# Patient Record
Sex: MALE | Race: WHITE | NOT HISPANIC OR LATINO | Employment: FULL TIME | URBAN - METROPOLITAN AREA
[De-identification: names, ages, dates, MRNs, and addresses within clinical notes are randomized per-mention and may not be internally consistent; named-entity substitution may affect disease eponyms.]

---

## 2017-02-02 ENCOUNTER — TELEPHONE (OUTPATIENT)
Dept: MEDICAL GROUP | Facility: MEDICAL CENTER | Age: 47
End: 2017-02-02

## 2017-02-02 DIAGNOSIS — Z00.00 PREVENTATIVE HEALTH CARE: Chronic | ICD-10-CM

## 2017-02-03 ENCOUNTER — HOSPITAL ENCOUNTER (OUTPATIENT)
Dept: RADIOLOGY | Facility: MEDICAL CENTER | Age: 47
End: 2017-02-03

## 2017-02-03 NOTE — TELEPHONE ENCOUNTER
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    Since he is almost due for the remainder of his labs, if he does not mind, we will check everything including his cholesterol, blood sugar, liver and kidney function, I will place all those fasting orders in the computer system

## 2017-02-08 ENCOUNTER — HOSPITAL ENCOUNTER (OUTPATIENT)
Dept: LAB | Facility: MEDICAL CENTER | Age: 47
End: 2017-02-08
Attending: INTERNAL MEDICINE
Payer: COMMERCIAL

## 2017-02-08 ENCOUNTER — TELEPHONE (OUTPATIENT)
Dept: MEDICAL GROUP | Facility: MEDICAL CENTER | Age: 47
End: 2017-02-08

## 2017-02-08 DIAGNOSIS — Z00.00 PREVENTATIVE HEALTH CARE: Chronic | ICD-10-CM

## 2017-02-08 LAB
25(OH)D3 SERPL-MCNC: 58 NG/ML (ref 30–100)
ALBUMIN SERPL BCP-MCNC: 4.7 G/DL (ref 3.2–4.9)
ALBUMIN/GLOB SERPL: 1.4 G/DL
ALP SERPL-CCNC: 104 U/L (ref 30–99)
ALT SERPL-CCNC: 20 U/L (ref 2–50)
ANION GAP SERPL CALC-SCNC: 7 MMOL/L (ref 0–11.9)
AST SERPL-CCNC: 24 U/L (ref 12–45)
BASOPHILS # BLD AUTO: 0.03 K/UL (ref 0–0.12)
BASOPHILS NFR BLD AUTO: 0.6 % (ref 0–1.8)
BILIRUB SERPL-MCNC: 1.2 MG/DL (ref 0.1–1.5)
BUN SERPL-MCNC: 15 MG/DL (ref 8–22)
CALCIUM SERPL-MCNC: 9.6 MG/DL (ref 8.5–10.5)
CHLORIDE SERPL-SCNC: 101 MMOL/L (ref 96–112)
CHOLEST SERPL-MCNC: 244 MG/DL (ref 100–199)
CO2 SERPL-SCNC: 27 MMOL/L (ref 20–33)
CREAT SERPL-MCNC: 1.06 MG/DL (ref 0.5–1.4)
EOSINOPHIL # BLD: 0.09 K/UL (ref 0–0.51)
EOSINOPHIL NFR BLD AUTO: 1.9 % (ref 0–6.9)
ERYTHROCYTE [DISTWIDTH] IN BLOOD BY AUTOMATED COUNT: 44 FL (ref 35.9–50)
GLOBULIN SER CALC-MCNC: 3.3 G/DL (ref 1.9–3.5)
GLUCOSE SERPL-MCNC: 92 MG/DL (ref 65–99)
HCT VFR BLD AUTO: 49.9 % (ref 42–52)
HDLC SERPL-MCNC: 65 MG/DL
HGB BLD-MCNC: 16.9 G/DL (ref 14–18)
IMM GRANULOCYTES # BLD AUTO: 0.01 K/UL (ref 0–0.11)
IMM GRANULOCYTES NFR BLD AUTO: 0.2 % (ref 0–0.9)
LDLC SERPL CALC-MCNC: 166 MG/DL
LYMPHOCYTES # BLD: 1.18 K/UL (ref 1–4.8)
LYMPHOCYTES NFR BLD AUTO: 24.9 % (ref 22–41)
MCH RBC QN AUTO: 31.6 PG (ref 27–33)
MCHC RBC AUTO-ENTMCNC: 33.9 G/DL (ref 33.7–35.3)
MCV RBC AUTO: 93.4 FL (ref 81.4–97.8)
MONOCYTES # BLD: 0.29 K/UL (ref 0–0.85)
MONOCYTES NFR BLD AUTO: 6.1 % (ref 0–13.4)
NEUTROPHILS # BLD: 3.14 K/UL (ref 1.82–7.42)
NEUTROPHILS NFR BLD AUTO: 66.3 % (ref 44–72)
NRBC # BLD AUTO: 0 K/UL
NRBC BLD-RTO: 0 /100 WBC
PLATELET # BLD AUTO: 291 K/UL (ref 164–446)
PMV BLD AUTO: 10.1 FL (ref 9–12.9)
POTASSIUM SERPL-SCNC: 4.6 MMOL/L (ref 3.6–5.5)
PROT SERPL-MCNC: 8 G/DL (ref 6–8.2)
RBC # BLD AUTO: 5.34 M/UL (ref 4.7–6.1)
SODIUM SERPL-SCNC: 135 MMOL/L (ref 135–145)
TRIGL SERPL-MCNC: 66 MG/DL (ref 0–149)
TSH SERPL DL<=0.005 MIU/L-ACNC: 0.91 UIU/ML (ref 0.3–3.7)
URATE SERPL-MCNC: 6.2 MG/DL (ref 2.5–8.3)
WBC # BLD AUTO: 4.7 K/UL (ref 4.8–10.8)

## 2017-02-08 PROCEDURE — 80053 COMPREHEN METABOLIC PANEL: CPT

## 2017-02-08 PROCEDURE — 82306 VITAMIN D 25 HYDROXY: CPT

## 2017-02-08 PROCEDURE — 85025 COMPLETE CBC W/AUTO DIFF WBC: CPT

## 2017-02-08 PROCEDURE — 84443 ASSAY THYROID STIM HORMONE: CPT

## 2017-02-08 PROCEDURE — 84550 ASSAY OF BLOOD/URIC ACID: CPT

## 2017-02-08 PROCEDURE — 80061 LIPID PANEL: CPT

## 2017-02-08 PROCEDURE — 36415 COLL VENOUS BLD VENIPUNCTURE: CPT

## 2017-02-09 ENCOUNTER — TELEPHONE (OUTPATIENT)
Dept: MEDICAL GROUP | Facility: MEDICAL CENTER | Age: 47
End: 2017-02-09

## 2017-02-10 NOTE — TELEPHONE ENCOUNTER
I notified the patient with results.    He had a recent MRI of his left knee at [facility], could you please call them and have the results faxed to us?

## 2017-02-10 NOTE — TELEPHONE ENCOUNTER
Notified with labs, LDL increased but had recent knee injury and fracture so has not been exercising, will obtain MRI copy of results from [facility]. No medication necessary for dyslipidemia, HDL is elevated, no history of hypertension, diabetes, tobacco.      Otherwise continue vitamin D supplementation    Uric acid 6.2, previous MRI showed potential gouty changes right first toe but has not had any gout symptoms, we can defer follow-up MRI imaging of that toe unless he experiences symptoms

## 2017-02-11 PROBLEM — S83.209A MENISCUS TEAR: Status: ACTIVE | Noted: 2017-02-11

## 2017-04-22 ENCOUNTER — HOSPITAL ENCOUNTER (OUTPATIENT)
Facility: MEDICAL CENTER | Age: 47
End: 2017-04-22
Attending: EMERGENCY MEDICINE
Payer: COMMERCIAL

## 2017-04-22 ENCOUNTER — OFFICE VISIT (OUTPATIENT)
Dept: URGENT CARE | Facility: CLINIC | Age: 47
End: 2017-04-22
Payer: COMMERCIAL

## 2017-04-22 VITALS
HEART RATE: 65 BPM | OXYGEN SATURATION: 97 % | DIASTOLIC BLOOD PRESSURE: 90 MMHG | RESPIRATION RATE: 16 BRPM | TEMPERATURE: 97.9 F | SYSTOLIC BLOOD PRESSURE: 132 MMHG

## 2017-04-22 DIAGNOSIS — J02.9 PHARYNGITIS, UNSPECIFIED ETIOLOGY: ICD-10-CM

## 2017-04-22 LAB
INT CON NEG: NEGATIVE
INT CON POS: POSITIVE
S PYO AG THROAT QL: NEGATIVE

## 2017-04-22 PROCEDURE — 87880 STREP A ASSAY W/OPTIC: CPT | Performed by: EMERGENCY MEDICINE

## 2017-04-22 PROCEDURE — 87070 CULTURE OTHR SPECIMN AEROBIC: CPT

## 2017-04-22 PROCEDURE — 99203 OFFICE O/P NEW LOW 30 MIN: CPT | Mod: 25 | Performed by: EMERGENCY MEDICINE

## 2017-04-22 RX ORDER — ERGOCALCIFEROL 1.25 MG/1
CAPSULE ORAL
COMMUNITY
End: 2023-01-19

## 2017-04-22 ASSESSMENT — ENCOUNTER SYMPTOMS
MYALGIAS: 1
SWOLLEN GLANDS: 1
COUGH: 0
CHILLS: 1
TROUBLE SWALLOWING: 0

## 2017-04-22 NOTE — MR AVS SNAPSHOT
Alexandre Hernández   2017 4:30 PM   Office Visit   MRN: [id number]    Department:  [facility]   Dept Phone:  [phone]    Description:  Male : 1970   Provider:  Johnson Arceo M.D.           Reason for Visit     Pharyngitis x 2 days,sore throat, fatigue, chills      Allergies as of 2017     Allergen Noted Reactions    Contrast Media With Iodine [Iodine] 2012   Anaphylaxis    Hydrocodone 2016   Rash    Rash      Oxycodone 2016   Rash    rash      Vital Signs     Blood Pressure Pulse Temperature Respirations Oxygen Saturation Smoking Status    132/90 mmHg 65 36.6 °C (97.9 °F) 16 97% Never Smoker       Basic Information     Date Of Birth Sex Race Ethnicity Preferred Language    1970 Male White Non- English      Problem List              ICD-10-CM Priority Class Noted - Resolved    S/P aortic valve replacement (Chronic) Z95.2   4/3/2012 - Present    Disorder of bursae and tendons in shoulder region M71.9, M67.919   2014 - Present    S/P rotator cuff surgery Z98.890   2016 - Present    Foot abscess, right L02.611   2016 - Present    S/P Achilles tendon repair Z98.890   2016 - Present    History of thumb surgery Z98.890   2016 - Present    Osteoma D16.9   2016 - Present    Skin cancer, basal cell C44.91   2016 - Present    Adverse reaction to contrast media T50.8X5A   2016 - Present    Dyslipidemia (Chronic) E78.5   2016 - Present    Neutropenia (CMS-HCC) D70.9   2016 - Present    Preventative health care (Chronic) Z00.00   2016 - Present    Meniscus tear S83.209A   2017 - Present      Health Maintenance        Date Due Completion Dates    IMM DTaP/Tdap/Td Vaccine (1 - Tdap) 1989 ---            Current Immunizations     Influenza Vaccine Quad Inj (Pf) 10/20/2016    Influenza Vaccine Quad Inj (Preserved) 11/3/2015, 10/15/2014    Tuberculin Skin Test 2013 12:30 PM, 2013 12:55 PM,  11/15/2012  1:30 PM      Below and/or attached are the medications your provider expects you to take. Review all of your home medications and newly ordered medications with your provider and/or pharmacist. Follow medication instructions as directed by your provider and/or pharmacist. Please keep your medication list with you and share with your provider. Update the information when medications are discontinued, doses are changed, or new medications (including over-the-counter products) are added; and carry medication information at all times in the event of emergency situations     Allergies:  CONTRAST MEDIA WITH IODINE - Anaphylaxis     HYDROCODONE - Rash     OXYCODONE - Rash               Medications  Valid as of: April 22, 2017 -  5:26 PM    Generic Name Brand Name Tablet Size Instructions for use    Aspirin   Take  by mouth.        Ergocalciferol (Cap) DRISDOL 36914 UNITS Take  by mouth every 7 days.        Fexofenadine HCl   Take  by mouth.        Fluticasone Propionate (Suspension) FLONASE 50 MCG/ACT Spray 1 Spray in nose every day.        .                 Medicines prescribed today were sent to:     [id number] - [place], [place] - [address]    [address] [facility] [address]    Phone: [phone] Fax: [phone]    Open 24 Hours?: No    "[id number] - [place], [place] - [address] AT [facility] [address]    [address] [facility] [address]    Phone: [phone] Fax: [phone]    Open 24 Hours?: No      Medication refill instructions:       If your prescription bottle indicates you have medication refills left, it is not necessary to call your provider’s office. Please contact your pharmacy and they will refill your medication.    If your prescription bottle indicates you do not have any refills left, you may request refills at any time through one of the following ways: The online [employer] system (except Urgent Care), by calling your provider’s office, or by asking your  pharmacy to contact your provider’s office with a refill request. Medication refills are processed only during regular business hours and may not be available until the next business day. Your provider may request additional information or to have a follow-up visit with you prior to refilling your medication.   *Please Note: Medication refills are assigned a new Rx number when refilled electronically. Your pharmacy may indicate that no refills were authorized even though a new prescription for the same medication is available at the pharmacy. Please request the medicine by name with the pharmacy before contacting your provider for a refill.        Other Notes About Your Plan     5/12/16 next visit pneumovax  3/16 repeat MRI right first toe one year, apparently the MRI right first toe findings in [place] did show evidence of chronic gout although the patient has been asymptomatic and has no history of gout with normal uric acid  2/8/17 uric 6.2 no further symptoms, defer follow-up MRI unless symptoms occur           MyChart Access Code: Activation code not generated  Current J[employer] Status: Active

## 2017-04-23 DIAGNOSIS — J02.9 PHARYNGITIS, UNSPECIFIED ETIOLOGY: ICD-10-CM

## 2017-04-23 NOTE — PROGRESS NOTES
Subjective:      Alexandre Hernández is a 47 y.o. male who presents with Pharyngitis            Pharyngitis   This is a new problem. Episode onset: 2 days. The problem has been unchanged. Neither side of throat is experiencing more pain than the other. Maximum temperature: subjective. The fever has been present for 1 to 2 days. The pain is mild. Associated symptoms include swollen glands. Pertinent negatives include no coughing, ear pain or trouble swallowing.    no recent significant exacerbation of allergic rhinitis  Review of Systems   Constitutional: Positive for chills and malaise/fatigue.   HENT: Negative for ear pain and trouble swallowing.    Respiratory: Negative for cough.    Musculoskeletal: Positive for myalgias.   Skin: Negative for rash.     PMH:  has a past medical history of Heart valve disease; Heart murmur; and Infectious disease.  MEDS:   Current outpatient prescriptions:   •  BABY ASPIRIN PO, Take  by mouth., Disp: , Rfl:   •  vitamin D, Ergocalciferol, (DRISDOL) 06511 UNITS Cap capsule, Take  by mouth every 7 days., Disp: , Rfl:   •  Fexofenadine HCl (ALLEGRA PO), Take  by mouth., Disp: , Rfl:   •  fluticasone (FLONASE) 50 MCG/ACT nasal spray, Spray 1 Spray in nose every day., Disp: , Rfl:   ALLERGIES:   Allergies   Allergen Reactions   • Contrast Media With Iodine [Iodine] Anaphylaxis   • Hydrocodone Rash     Rash     • Oxycodone Rash     rash     SURGHX:   Past Surgical History   Procedure Laterality Date   • Angiogram  2/2012   • Achilles tendon repair  1990     left   • Shoulder arthroscopy w/ rotator cuff repair  11/5/2014     Performed by Nestor Clark M.D. at SURGERY [facility]   • Shoulder decompression arthroscopic  11/5/2014     Performed by Nestor Clark M.D. at [facility]   • Other  3/2012     aortic (tissue) valve replacement   • Other  1989     removal of osteoid osteoma (T12)   • Other  2004     orif left thumb   • Other Right 2016     abscess on right foot  drained   • Recovery  4/25/2016     Procedure: CATH LAB [facility];  Surgeon: Recoveryonly Surgery;  Location: SURGERY PRE-POST PROC UNIT [facility];  Service:      SOCHX:  reports that he has never smoked. He has never used smokeless tobacco. He reports that he drinks about 2.4 oz of alcohol per week. He reports that he does not use illicit drugs.  FH: family history includes Hypertension in an other family member; Stroke in an other family member.       Objective:     /90 mmHg  Pulse 65  Temp(Src) 36.6 °C (97.9 °F)  Resp 16  SpO2 97%     Physical Exam   Constitutional: He appears well-developed and well-nourished. He is cooperative. He does not appear ill. No distress.   HENT:   Head: Normocephalic.   Right Ear: Tympanic membrane and ear canal normal.   Left Ear: Tympanic membrane and ear canal normal.   Nose: No rhinorrhea.   Mouth/Throat: Uvula is midline. No trismus in the jaw. Posterior oropharyngeal erythema present. No oropharyngeal exudate, posterior oropharyngeal edema or tonsillar abscesses.   Eyes: Conjunctivae are normal.   Neck: Trachea normal. Neck supple.   Cardiovascular: Normal rate, regular rhythm and normal heart sounds.    Pulmonary/Chest: Effort normal and breath sounds normal.   Lymphadenopathy:     He has cervical adenopathy.        Right cervical: Superficial cervical adenopathy present.        Left cervical: Superficial cervical adenopathy present.   Neurological: He is alert.   Skin: Skin is warm and dry.   Psychiatric: His behavior is normal.   Vitals reviewed.         Due to concerns associated with endocarditis risk will culture throat.     Assessment/Plan:     1. Pharyngitis, unspecified etiology  Negative- POCT Rapid Strep A  - CULTURE THROAT; Future

## 2017-04-25 ENCOUNTER — TELEPHONE (OUTPATIENT)
Dept: URGENT CARE | Facility: PHYSICIAN GROUP | Age: 47
End: 2017-04-25

## 2017-04-25 LAB
BACTERIA SPEC RESP CULT: NORMAL
SIGNIFICANT IND 70042: NORMAL
SOURCE SOURCE: NORMAL

## 2017-10-17 ENCOUNTER — IMMUNIZATION (OUTPATIENT)
Dept: OCCUPATIONAL MEDICINE | Facility: CLINIC | Age: 47
End: 2017-10-17

## 2017-10-17 DIAGNOSIS — Z23 NEED FOR VACCINATION: ICD-10-CM

## 2017-10-17 PROCEDURE — 90686 IIV4 VACC NO PRSV 0.5 ML IM: CPT | Performed by: PREVENTIVE MEDICINE

## 2019-04-12 ENCOUNTER — APPOINTMENT (RX ONLY)
Dept: URBAN - METROPOLITAN AREA CLINIC 36 | Facility: CLINIC | Age: 49
Setting detail: DERMATOLOGY
End: 2019-04-12

## 2019-04-12 DIAGNOSIS — L72.0 EPIDERMAL CYST: ICD-10-CM

## 2019-04-12 DIAGNOSIS — L82.1 OTHER SEBORRHEIC KERATOSIS: ICD-10-CM

## 2019-04-12 DIAGNOSIS — L81.4 OTHER MELANIN HYPERPIGMENTATION: ICD-10-CM

## 2019-04-12 DIAGNOSIS — D22 MELANOCYTIC NEVI: ICD-10-CM

## 2019-04-12 DIAGNOSIS — L57.0 ACTINIC KERATOSIS: ICD-10-CM

## 2019-04-12 PROBLEM — D22.5 MELANOCYTIC NEVI OF TRUNK: Status: ACTIVE | Noted: 2019-04-12

## 2019-04-12 PROCEDURE — ? LIQUID NITROGEN

## 2019-04-12 PROCEDURE — 17000 DESTRUCT PREMALG LESION: CPT

## 2019-04-12 PROCEDURE — 99213 OFFICE O/P EST LOW 20 MIN: CPT | Mod: 25

## 2019-04-12 PROCEDURE — 17003 DESTRUCT PREMALG LES 2-14: CPT

## 2019-04-12 PROCEDURE — ? COUNSELING

## 2019-04-12 PROCEDURE — ? PHOTO-DOCUMENTATION

## 2019-04-12 PROCEDURE — ? EXTRACTIONS

## 2019-04-12 ASSESSMENT — LOCATION ZONE DERM
LOCATION ZONE: EYELID
LOCATION ZONE: ARM
LOCATION ZONE: TRUNK

## 2019-04-12 ASSESSMENT — LOCATION SIMPLE DESCRIPTION DERM
LOCATION SIMPLE: LEFT UPPER BACK
LOCATION SIMPLE: RIGHT EYELID
LOCATION SIMPLE: RIGHT FOREARM
LOCATION SIMPLE: ABDOMEN
LOCATION SIMPLE: RIGHT UPPER BACK
LOCATION SIMPLE: CHEST

## 2019-04-12 ASSESSMENT — LOCATION DETAILED DESCRIPTION DERM
LOCATION DETAILED: LEFT SUPERIOR LATERAL UPPER BACK
LOCATION DETAILED: RIGHT MEDIAL UPPER BACK
LOCATION DETAILED: EPIGASTRIC SKIN
LOCATION DETAILED: RIGHT SUPERIOR MEDIAL UPPER BACK
LOCATION DETAILED: LEFT LATERAL SUPERIOR CHEST
LOCATION DETAILED: RIGHT DISTAL DORSAL FOREARM
LOCATION DETAILED: RIGHT MEDIAL CANTHUS

## 2019-04-12 NOTE — PROCEDURE: EXTRACTIONS
Acne Type: Comedonal Lesions
Render Number Of Lesions Treated: no
Consent was obtained and risks were reviewed including but not limited to scarring, infection, bleeding, scabbing, incomplete removal, and allergy to anesthesia.
Extraction Method: 18 gauge needle
Prep Text (Optional): Prior to removal the treatment areas were prepped in the usual fashion.
Detail Level: Detailed
Post-Care Instructions: I reviewed with the patient in detail post-care instructions.

## 2019-04-12 NOTE — PROCEDURE: LIQUID NITROGEN
Duration Of Freeze Thaw-Cycle (Seconds): 3
Post-Care Instructions: I reviewed with the patient in detail post-care instructions. Patient is to wear sunprotection, and avoid picking at any of the treated lesions. Pt may apply Vaseline to crusted or scabbing areas.
Detail Level: Simple
Number Of Freeze-Thaw Cycles: 2 freeze-thaw cycles
Render Post-Care Instructions In Note?: yes
Consent: The patient's verbal consent was obtained including but not limited to risks of crusting, scabbing, blistering, scarring, darker or lighter pigmentary change, recurrence, incomplete removal and infection.

## 2019-09-03 ENCOUNTER — TELEPHONE (OUTPATIENT)
Dept: MEDICAL GROUP | Facility: MEDICAL CENTER | Age: 49
End: 2019-09-03

## 2019-09-03 DIAGNOSIS — Z00.00 PREVENTATIVE HEALTH CARE: ICD-10-CM

## 2019-09-10 ENCOUNTER — IMMUNIZATION (OUTPATIENT)
Dept: OCCUPATIONAL MEDICINE | Facility: CLINIC | Age: 49
End: 2019-09-10

## 2019-09-10 DIAGNOSIS — Z23 NEED FOR VACCINATION: ICD-10-CM

## 2019-09-10 PROCEDURE — 90686 IIV4 VACC NO PRSV 0.5 ML IM: CPT | Performed by: PREVENTIVE MEDICINE

## 2019-09-26 DIAGNOSIS — Q24.8: ICD-10-CM

## 2019-09-26 NOTE — Clinical Note
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  Also a 30-minute new patient appointment with me?

## 2019-09-26 NOTE — PROGRESS NOTES
spoke w pt -   Has arranged to follow-up with us in the office.  I have taken the liberty of ordering his echocardiogram, had a valve replacement several years ago needs follow-up in regards to restenosis

## 2019-09-26 NOTE — PROGRESS NOTES
Fatmata Bowens M.D.  Nichole Winston, R.N.             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  Also a 30-minute new patient appointment with me?        Echo order printed and handed to Angel to call pt and schedule testing.     Spoke with Ayla- as well to call pt and schedule appt

## 2019-09-30 NOTE — PROGRESS NOTES
Ayla Brown, Med Ass't  Nichole Winston, RADHA.             I called the patient last week and the appointment that we had holding for him did not work with his schedule. They said they would call back, as of now he still has not called back to schedule an appointment with LA.     Thank you,   Ayla PEDERSON          Noted

## 2019-10-01 ENCOUNTER — HOSPITAL ENCOUNTER (OUTPATIENT)
Dept: LAB | Facility: MEDICAL CENTER | Age: 49
End: 2019-10-01
Attending: INTERNAL MEDICINE
Payer: COMMERCIAL

## 2019-10-01 DIAGNOSIS — Z00.00 PREVENTATIVE HEALTH CARE: ICD-10-CM

## 2019-10-01 LAB
25(OH)D3 SERPL-MCNC: 58 NG/ML (ref 30–100)
ALBUMIN SERPL BCP-MCNC: 4.5 G/DL (ref 3.2–4.9)
ALBUMIN/GLOB SERPL: 1.6 G/DL
ALP SERPL-CCNC: 85 U/L (ref 30–99)
ALT SERPL-CCNC: 16 U/L (ref 2–50)
ANION GAP SERPL CALC-SCNC: 8 MMOL/L (ref 0–11.9)
APPEARANCE UR: ABNORMAL
AST SERPL-CCNC: 20 U/L (ref 12–45)
BACTERIA #/AREA URNS HPF: NEGATIVE /HPF
BASOPHILS # BLD AUTO: 1 % (ref 0–1.8)
BASOPHILS # BLD: 0.04 K/UL (ref 0–0.12)
BILIRUB SERPL-MCNC: 0.4 MG/DL (ref 0.1–1.5)
BILIRUB UR QL STRIP.AUTO: NEGATIVE
BUN SERPL-MCNC: 11 MG/DL (ref 8–22)
CALCIUM SERPL-MCNC: 8.9 MG/DL (ref 8.5–10.5)
CAOX CRY #/AREA URNS HPF: ABNORMAL /HPF
CHLORIDE SERPL-SCNC: 104 MMOL/L (ref 96–112)
CHOLEST SERPL-MCNC: 198 MG/DL (ref 100–199)
CO2 SERPL-SCNC: 27 MMOL/L (ref 20–33)
COLOR UR: YELLOW
CREAT SERPL-MCNC: 1.08 MG/DL (ref 0.5–1.4)
EOSINOPHIL # BLD AUTO: 0.26 K/UL (ref 0–0.51)
EOSINOPHIL NFR BLD: 6.7 % (ref 0–6.9)
EPI CELLS #/AREA URNS HPF: NEGATIVE /HPF
ERYTHROCYTE [DISTWIDTH] IN BLOOD BY AUTOMATED COUNT: 46.6 FL (ref 35.9–50)
EST. AVERAGE GLUCOSE BLD GHB EST-MCNC: 94 MG/DL
FASTING STATUS PATIENT QL REPORTED: NORMAL
GLOBULIN SER CALC-MCNC: 2.8 G/DL (ref 1.9–3.5)
GLUCOSE SERPL-MCNC: 80 MG/DL (ref 65–99)
GLUCOSE UR STRIP.AUTO-MCNC: NEGATIVE MG/DL
HBA1C MFR BLD: 4.9 % (ref 0–5.6)
HCT VFR BLD AUTO: 46.6 % (ref 42–52)
HDLC SERPL-MCNC: 48 MG/DL
HGB BLD-MCNC: 15.1 G/DL (ref 14–18)
HYALINE CASTS #/AREA URNS LPF: ABNORMAL /LPF
IMM GRANULOCYTES # BLD AUTO: 0.01 K/UL (ref 0–0.11)
IMM GRANULOCYTES NFR BLD AUTO: 0.3 % (ref 0–0.9)
KETONES UR STRIP.AUTO-MCNC: NEGATIVE MG/DL
LDLC SERPL CALC-MCNC: 120 MG/DL
LEUKOCYTE ESTERASE UR QL STRIP.AUTO: NEGATIVE
LYMPHOCYTES # BLD AUTO: 0.97 K/UL (ref 1–4.8)
LYMPHOCYTES NFR BLD: 24.9 % (ref 22–41)
MCH RBC QN AUTO: 31.9 PG (ref 27–33)
MCHC RBC AUTO-ENTMCNC: 32.4 G/DL (ref 33.7–35.3)
MCV RBC AUTO: 98.5 FL (ref 81.4–97.8)
MICRO URNS: ABNORMAL
MONOCYTES # BLD AUTO: 0.44 K/UL (ref 0–0.85)
MONOCYTES NFR BLD AUTO: 11.3 % (ref 0–13.4)
NEUTROPHILS # BLD AUTO: 2.17 K/UL (ref 1.82–7.42)
NEUTROPHILS NFR BLD: 55.8 % (ref 44–72)
NITRITE UR QL STRIP.AUTO: NEGATIVE
NRBC # BLD AUTO: 0 K/UL
NRBC BLD-RTO: 0 /100 WBC
PH UR STRIP.AUTO: 5.5 [PH] (ref 5–8)
PLATELET # BLD AUTO: 233 K/UL (ref 164–446)
PMV BLD AUTO: 10.7 FL (ref 9–12.9)
POTASSIUM SERPL-SCNC: 4.3 MMOL/L (ref 3.6–5.5)
PROT SERPL-MCNC: 7.3 G/DL (ref 6–8.2)
PROT UR QL STRIP: NEGATIVE MG/DL
PSA SERPL-MCNC: 0.75 NG/ML (ref 0–4)
RBC # BLD AUTO: 4.73 M/UL (ref 4.7–6.1)
RBC # URNS HPF: ABNORMAL /HPF
RBC UR QL AUTO: NEGATIVE
SODIUM SERPL-SCNC: 139 MMOL/L (ref 135–145)
SP GR UR STRIP.AUTO: 1.01
TRIGL SERPL-MCNC: 152 MG/DL (ref 0–149)
TSH SERPL DL<=0.005 MIU/L-ACNC: 1.26 UIU/ML (ref 0.38–5.33)
UROBILINOGEN UR STRIP.AUTO-MCNC: 0.2 MG/DL
WBC # BLD AUTO: 3.9 K/UL (ref 4.8–10.8)
WBC #/AREA URNS HPF: ABNORMAL /HPF

## 2019-10-01 PROCEDURE — 81001 URINALYSIS AUTO W/SCOPE: CPT

## 2019-10-01 PROCEDURE — 84153 ASSAY OF PSA TOTAL: CPT

## 2019-10-01 PROCEDURE — 82306 VITAMIN D 25 HYDROXY: CPT

## 2019-10-01 PROCEDURE — 83036 HEMOGLOBIN GLYCOSYLATED A1C: CPT

## 2019-10-01 PROCEDURE — 85025 COMPLETE CBC W/AUTO DIFF WBC: CPT

## 2019-10-01 PROCEDURE — 80061 LIPID PANEL: CPT

## 2019-10-01 PROCEDURE — 36415 COLL VENOUS BLD VENIPUNCTURE: CPT

## 2019-10-01 PROCEDURE — 80053 COMPREHEN METABOLIC PANEL: CPT

## 2019-10-01 PROCEDURE — 84443 ASSAY THYROID STIM HORMONE: CPT

## 2019-10-16 ENCOUNTER — HOSPITAL ENCOUNTER (OUTPATIENT)
Dept: CARDIOLOGY | Facility: MEDICAL CENTER | Age: 49
End: 2019-10-16
Attending: INTERNAL MEDICINE
Payer: COMMERCIAL

## 2019-10-16 DIAGNOSIS — Q24.8: ICD-10-CM

## 2019-10-16 PROCEDURE — 93306 TTE W/DOPPLER COMPLETE: CPT

## 2019-10-17 LAB
LV EJECT FRACT  99904: 65
LV EJECT FRACT MOD 2C 99903: 68.01
LV EJECT FRACT MOD 4C 99902: 65.27
LV EJECT FRACT MOD BP 99901: 66.95

## 2019-10-17 PROCEDURE — 93306 TTE W/DOPPLER COMPLETE: CPT | Mod: 26 | Performed by: INTERNAL MEDICINE

## 2019-10-30 ENCOUNTER — APPOINTMENT (RX ONLY)
Dept: URBAN - METROPOLITAN AREA CLINIC 36 | Facility: CLINIC | Age: 49
Setting detail: DERMATOLOGY
End: 2019-10-30

## 2019-10-30 DIAGNOSIS — L82.1 OTHER SEBORRHEIC KERATOSIS: ICD-10-CM

## 2019-10-30 DIAGNOSIS — D18.0 HEMANGIOMA: ICD-10-CM

## 2019-10-30 DIAGNOSIS — L91.8 OTHER HYPERTROPHIC DISORDERS OF THE SKIN: ICD-10-CM

## 2019-10-30 DIAGNOSIS — D17 BENIGN LIPOMATOUS NEOPLASM: ICD-10-CM

## 2019-10-30 DIAGNOSIS — L73.8 OTHER SPECIFIED FOLLICULAR DISORDERS: ICD-10-CM

## 2019-10-30 PROBLEM — D18.01 HEMANGIOMA OF SKIN AND SUBCUTANEOUS TISSUE: Status: ACTIVE | Noted: 2019-10-30

## 2019-10-30 PROBLEM — D23.39 OTHER BENIGN NEOPLASM OF SKIN OF OTHER PARTS OF FACE: Status: ACTIVE | Noted: 2019-10-30

## 2019-10-30 PROBLEM — D17.1 BENIGN LIPOMATOUS NEOPLASM OF SKIN AND SUBCUTANEOUS TISSUE OF TRUNK: Status: ACTIVE | Noted: 2019-10-30

## 2019-10-30 PROCEDURE — ? LIQUID NITROGEN (COSMETIC)

## 2019-10-30 PROCEDURE — ? COUNSELING

## 2019-10-30 PROCEDURE — ? BENIGN DESTRUCTION COSMETIC

## 2019-10-30 PROCEDURE — ? CONSULTATION EXCISION

## 2019-10-30 PROCEDURE — 99213 OFFICE O/P EST LOW 20 MIN: CPT

## 2019-10-30 ASSESSMENT — LOCATION SIMPLE DESCRIPTION DERM
LOCATION SIMPLE: RIGHT SCALP
LOCATION SIMPLE: RIGHT BUTTOCK
LOCATION SIMPLE: LEFT FOREHEAD
LOCATION SIMPLE: RIGHT ANTERIOR NECK
LOCATION SIMPLE: POSTERIOR NECK
LOCATION SIMPLE: RIGHT FOREARM
LOCATION SIMPLE: RIGHT CHEEK

## 2019-10-30 ASSESSMENT — LOCATION ZONE DERM
LOCATION ZONE: TRUNK
LOCATION ZONE: NECK
LOCATION ZONE: ARM
LOCATION ZONE: FACE
LOCATION ZONE: SCALP

## 2019-10-30 ASSESSMENT — LOCATION DETAILED DESCRIPTION DERM
LOCATION DETAILED: RIGHT INFERIOR LATERAL MALAR CHEEK
LOCATION DETAILED: LEFT SUPERIOR POSTERIOR NECK
LOCATION DETAILED: LEFT INFERIOR LATERAL FOREHEAD
LOCATION DETAILED: RIGHT CLAVICULAR NECK
LOCATION DETAILED: RIGHT DISTAL DORSAL FOREARM
LOCATION DETAILED: RIGHT MEDIAL BUTTOCK
LOCATION DETAILED: LEFT INFERIOR LATERAL NECK
LOCATION DETAILED: LEFT POSTERIOR NECK
LOCATION DETAILED: RIGHT CENTRAL FRONTAL SCALP

## 2019-10-30 NOTE — PROCEDURE: LIQUID NITROGEN (COSMETIC)
Price (Use Numbers Only, No Special Characters Or $): 0
Detail Level: Simple
Consent: The patient's consent was obtained including but not limited to risks of crusting, scabbing, blistering, scarring, darker or lighter pigmentary change, recurrence, incomplete removal and infection. The patient understands that the procedure is cosmetic in nature and is not covered by insurance.
Post-Care Instructions: I reviewed with the patient in detail post-care instructions. Patient is to wear sunprotection, and avoid picking at any of the treated lesions. Pt may apply Vaseline to crusted or scabbing areas.
Render Post-Care Instructions In Note?: yes

## 2019-10-30 NOTE — PROCEDURE: BENIGN DESTRUCTION COSMETIC
Consent: The patient's consent was obtained including but not limited to risks of crusting, scabbing, blistering, scarring, darker or lighter pigmentary change, recurrence, incomplete removal and infection.
Detail Level: Simple
Price (Use Numbers Only, No Special Characters Or $): 0
Post-Care Instructions: I reviewed with the patient in detail post-care instructions. Patient is to wear sunprotection, and avoid picking at any of the treated lesions. Pt may apply Vaseline to crusted or scabbing areas.
Anesthesia Volume In Cc: 0.5

## 2019-10-30 NOTE — PROCEDURE: COUNSELING
Detail Level: Zone
Patient Specific Counseling (Will Not Stick From Patient To Patient): Discussed Fraxel 1927 paired with microneedling
Detail Level: Simple
Detail Level: Detailed

## 2019-11-25 ENCOUNTER — TELEPHONE (OUTPATIENT)
Dept: MEDICAL GROUP | Facility: MEDICAL CENTER | Age: 49
End: 2019-11-25

## 2019-11-25 ENCOUNTER — OFFICE VISIT (OUTPATIENT)
Dept: MEDICAL GROUP | Facility: MEDICAL CENTER | Age: 49
End: 2019-11-25
Payer: COMMERCIAL

## 2019-11-25 VITALS
HEART RATE: 67 BPM | BODY MASS INDEX: 24.34 KG/M2 | TEMPERATURE: 98 F | OXYGEN SATURATION: 98 % | SYSTOLIC BLOOD PRESSURE: 128 MMHG | WEIGHT: 170 LBS | HEIGHT: 70 IN | DIASTOLIC BLOOD PRESSURE: 78 MMHG

## 2019-11-25 DIAGNOSIS — Z98.890 S/P ACHILLES TENDON REPAIR: ICD-10-CM

## 2019-11-25 DIAGNOSIS — Z95.2 S/P AORTIC VALVE REPLACEMENT: Chronic | ICD-10-CM

## 2019-11-25 DIAGNOSIS — C44.91 SKIN CANCER, BASAL CELL: ICD-10-CM

## 2019-11-25 DIAGNOSIS — E78.5 DYSLIPIDEMIA: Chronic | ICD-10-CM

## 2019-11-25 DIAGNOSIS — Z00.00 PREVENTATIVE HEALTH CARE: Chronic | ICD-10-CM

## 2019-11-25 DIAGNOSIS — Z23 NEED FOR TDAP VACCINATION: ICD-10-CM

## 2019-11-25 PROCEDURE — 90715 TDAP VACCINE 7 YRS/> IM: CPT | Performed by: INTERNAL MEDICINE

## 2019-11-25 PROCEDURE — 99396 PREV VISIT EST AGE 40-64: CPT | Mod: 25 | Performed by: INTERNAL MEDICINE

## 2019-11-25 PROCEDURE — 90471 IMMUNIZATION ADMIN: CPT | Performed by: INTERNAL MEDICINE

## 2019-11-25 RX ORDER — M-VIT,TX,IRON,MINS/CALC/FOLIC 27MG-0.4MG
1 TABLET ORAL DAILY
COMMUNITY
End: 2023-10-28

## 2019-11-25 ASSESSMENT — ENCOUNTER SYMPTOMS
CONSTIPATION: 0
INSOMNIA: 0
HEARTBURN: 0
BACK PAIN: 0
BLURRED VISION: 0
SHORTNESS OF BREATH: 0
PALPITATIONS: 0
DOUBLE VISION: 0

## 2019-11-25 ASSESSMENT — PATIENT HEALTH QUESTIONNAIRE - PHQ9: CLINICAL INTERPRETATION OF PHQ2 SCORE: 0

## 2019-11-25 NOTE — PROGRESS NOTES
Subjective:      Alexandre Hernández is a 49 y.o. male who presents annual         HPI        Here for annual exam last seen 3 years ago, medications, allergies, medical history, surgical history, social history, family history reviewed and updated  Sees cardiology status post aortic valve replacement no chest pain, palpitations, lightheadedness, syncope, most recent echo October 2019 normal bioprosthetic valve  Sees eyezone for annual eye exam uses contact lenses   Sees dermatology  for skin checks once a year  Sees dentist twice per year   Has had facial seborheic dermatitis   No regular nsaids   Allergies uses allegra and flonase on a regular basis with control of allergy symptoms  SH , two children, works ER physician and exercises 3 times per week mountain bike or treadmill 3-5 miles at a time, and weights training twice per week, eats healthy diet, and drinks water daily 4 cups per day at work and seltzer water per day, coffee none, no regular soda does drink diet coke three times per week, etoh occasionally         Current Outpatient Medications   Medication Sig Dispense Refill   • BABY ASPIRIN PO Take  by mouth.     • vitamin D, Ergocalciferol, (DRISDOL) 51801 UNITS Cap capsule Take  by mouth every 7 days.     • Fexofenadine HCl (ALLEGRA PO) Take  by mouth.     • fluticasone (FLONASE) 50 MCG/ACT nasal spray Spray 1 Spray in nose every day.       No current facility-administered medications for this visit.      Patient Active Problem List   Diagnosis   • S/P aortic valve replacement   • S/P rotator cuff surgery   • History foot abscess, right   • S/P Achilles tendon repair   • History of thumb surgery   • History T12 osteoma   • Skin cancer, basal cell   • Adverse reaction to contrast media   • Dyslipidemia   • Neutropenia (CMS-HCC)   • Preventative health care   • Meniscus tear         Review of Systems   Eyes: Negative for blurred vision and double vision.   Respiratory: Negative for shortness  of breath.    Cardiovascular: Negative for chest pain and palpitations.   Gastrointestinal: Negative for constipation and heartburn.   Musculoskeletal: Negative for back pain and joint pain.   Skin: Negative for itching and rash.   Endo/Heme/Allergies: Positive for environmental allergies.   Psychiatric/Behavioral: The patient does not have insomnia.           Objective:          Physical Exam  Vitals signs and nursing note reviewed.   Constitutional:       Appearance: Normal appearance.   HENT:      Head: Normocephalic and atraumatic.      Right Ear: Tympanic membrane normal.      Left Ear: Tympanic membrane normal.      Nose: Nose normal.      Mouth/Throat:      Mouth: Mucous membranes are moist.   Eyes:      General:         Right eye: No discharge.         Left eye: No discharge.   Neck:      Musculoskeletal: No neck rigidity.   Cardiovascular:      Rate and Rhythm: Normal rate and regular rhythm.   Pulmonary:      Effort: Pulmonary effort is normal.   Abdominal:      General: There is no distension.      Tenderness: There is no tenderness.   Genitourinary:     Prostate: Normal.   Musculoskeletal:         General: No swelling.   Skin:     General: Skin is warm.   Neurological:      General: No focal deficit present.      Mental Status: He is alert.   Psychiatric:         Mood and Affect: Mood normal.         Behavior: Behavior normal.                 Assessment/Plan:       Assessment  #! Annual examination    #2 status post aortic valve replacement followed by cardiology most recent echo October 16 normal bioprosthetic valve functioning normally, asymptomatic no chest pain, no shortness of breath, or palpitations with activity    #3 dyslipidemia diet controlled 10/2/19 chol 198,trig 152,hdl 48,ldl 120     #4 allergic rhinitis stable on over-the-counter Flonase and Allegra    #5 history of basal cell skin cancer followed by dermatology, uses sunscreen    #6 neutropenia WBC 3.9 no recurrent infections,  stable    #7 dyslipidemia diet-controlled cholesterol 198, HDL 40, , 10-year risk of 3%    #8 preventative health has had colonoscopy no records, has had influenza vaccine      Plan  #1 old records colon [facility] had done in 2015 we will obtain copy    #2 has had influenza vaccine    #3 tdap     #4 continue nutrition, diet, exercise    #5 continue sunscreen, follow-up dermatology    #6 follow-up cardiology annually    #7 continue Flonase and Allegra as needed    #8 follow-up 1 year repeat labs at that time

## 2020-03-04 ENCOUNTER — EH NON-PROVIDER (OUTPATIENT)
Dept: OCCUPATIONAL MEDICINE | Facility: CLINIC | Age: 50
End: 2020-03-04

## 2020-03-04 DIAGNOSIS — Z02.89 ENCOUNTER FOR OCCUPATIONAL HEALTH EXAMINATION INVOLVING RESPIRATOR: ICD-10-CM

## 2020-03-04 PROCEDURE — 94375 RESPIRATORY FLOW VOLUME LOOP: CPT | Performed by: PREVENTIVE MEDICINE

## 2020-03-06 ENCOUNTER — EH NON-PROVIDER (OUTPATIENT)
Dept: OCCUPATIONAL MEDICINE | Facility: CLINIC | Age: 50
End: 2020-03-06

## 2020-03-06 DIAGNOSIS — Z01.89 RESPIRATORY CLEARANCE EXAMINATION, ENCOUNTER FOR: ICD-10-CM

## 2020-10-27 ENCOUNTER — NON-PROVIDER VISIT (OUTPATIENT)
Dept: OCCUPATIONAL MEDICINE | Facility: CLINIC | Age: 50
End: 2020-10-27

## 2020-10-27 DIAGNOSIS — Z23 IMMUNIZATION DUE: ICD-10-CM

## 2020-10-27 PROCEDURE — 90686 IIV4 VACC NO PRSV 0.5 ML IM: CPT | Performed by: NURSE PRACTITIONER

## 2020-12-16 DIAGNOSIS — Z23 NEED FOR VACCINATION: ICD-10-CM

## 2020-12-20 ENCOUNTER — APPOINTMENT (OUTPATIENT)
Dept: FAMILY PLANNING/WOMEN'S HEALTH CLINIC | Facility: IMMUNIZATION CENTER | Age: 50
End: 2020-12-20
Attending: FAMILY MEDICINE
Payer: COMMERCIAL

## 2020-12-20 DIAGNOSIS — Z23 NEED FOR VACCINATION: ICD-10-CM

## 2020-12-20 PROCEDURE — 0001A PFIZER SARS-COV-2 VACCINE: CPT

## 2020-12-20 PROCEDURE — 91300 PFIZER SARS-COV-2 VACCINE: CPT

## 2020-12-21 ENCOUNTER — IMMUNIZATION (OUTPATIENT)
Dept: FAMILY PLANNING/WOMEN'S HEALTH CLINIC | Facility: IMMUNIZATION CENTER | Age: 50
End: 2020-12-21
Payer: COMMERCIAL

## 2020-12-21 DIAGNOSIS — Z23 ENCOUNTER FOR VACCINATION: Primary | ICD-10-CM

## 2021-01-10 ENCOUNTER — IMMUNIZATION (OUTPATIENT)
Dept: FAMILY PLANNING/WOMEN'S HEALTH CLINIC | Facility: IMMUNIZATION CENTER | Age: 51
End: 2021-01-10
Attending: FAMILY MEDICINE
Payer: COMMERCIAL

## 2021-01-10 DIAGNOSIS — Z23 ENCOUNTER FOR VACCINATION: Primary | ICD-10-CM

## 2021-01-10 PROCEDURE — 91300 PFIZER SARS-COV-2 VACCINE: CPT

## 2021-01-10 PROCEDURE — 0002A PFIZER SARS-COV-2 VACCINE: CPT

## 2021-07-29 DIAGNOSIS — M25.512 LEFT SHOULDER PAIN, UNSPECIFIED CHRONICITY: ICD-10-CM

## 2021-07-31 ENCOUNTER — HOSPITAL ENCOUNTER (OUTPATIENT)
Dept: RADIOLOGY | Facility: MEDICAL CENTER | Age: 51
End: 2021-07-31
Attending: EMERGENCY MEDICINE
Payer: COMMERCIAL

## 2021-07-31 DIAGNOSIS — M25.512 LEFT SHOULDER PAIN, UNSPECIFIED CHRONICITY: ICD-10-CM

## 2021-07-31 PROCEDURE — 73221 MRI JOINT UPR EXTREM W/O DYE: CPT | Mod: LT

## 2021-08-17 PROBLEM — S46.012A STRAIN OF TENDON OF LEFT ROTATOR CUFF: Status: ACTIVE | Noted: 2021-08-17

## 2021-08-19 DIAGNOSIS — I35.0 NONRHEUMATIC AORTIC VALVE STENOSIS: ICD-10-CM

## 2021-08-25 ENCOUNTER — HOSPITAL ENCOUNTER (OUTPATIENT)
Dept: CARDIOLOGY | Facility: MEDICAL CENTER | Age: 51
End: 2021-08-25
Attending: INTERNAL MEDICINE
Payer: COMMERCIAL

## 2021-08-25 DIAGNOSIS — I35.0 NONRHEUMATIC AORTIC VALVE STENOSIS: ICD-10-CM

## 2021-08-25 PROCEDURE — 93306 TTE W/DOPPLER COMPLETE: CPT

## 2021-08-26 LAB
LV EJECT FRACT  99904: 60
LV EJECT FRACT MOD 2C 99903: 58.7
LV EJECT FRACT MOD 4C 99902: 66.65
LV EJECT FRACT MOD BP 99901: 62.12

## 2021-09-08 ENCOUNTER — TELEPHONE (OUTPATIENT)
Dept: MEDICAL GROUP | Facility: MEDICAL CENTER | Age: 51
End: 2021-09-08

## 2021-09-08 DIAGNOSIS — Z00.00 PREVENTATIVE HEALTH CARE: Chronic | ICD-10-CM

## 2021-09-08 PROBLEM — S46.012A STRAIN OF TENDON OF LEFT ROTATOR CUFF: Status: RESOLVED | Noted: 2021-08-17 | Resolved: 2021-09-08

## 2021-09-08 RX ORDER — AMOXICILLIN 500 MG/1
CAPSULE ORAL
COMMUNITY
Start: 2021-07-31 | End: 2023-01-19

## 2021-09-09 ENCOUNTER — OFFICE VISIT (OUTPATIENT)
Dept: MEDICAL GROUP | Facility: MEDICAL CENTER | Age: 51
End: 2021-09-09
Payer: COMMERCIAL

## 2021-09-09 ENCOUNTER — TELEPHONE (OUTPATIENT)
Dept: MEDICAL GROUP | Facility: MEDICAL CENTER | Age: 51
End: 2021-09-09

## 2021-09-09 VITALS
SYSTOLIC BLOOD PRESSURE: 126 MMHG | WEIGHT: 167 LBS | OXYGEN SATURATION: 96 % | DIASTOLIC BLOOD PRESSURE: 78 MMHG | HEIGHT: 70 IN | TEMPERATURE: 98.3 F | HEART RATE: 74 BPM | BODY MASS INDEX: 23.91 KG/M2

## 2021-09-09 DIAGNOSIS — Z98.890 S/P ROTATOR CUFF SURGERY: ICD-10-CM

## 2021-09-09 DIAGNOSIS — E55.9 VITAMIN D DEFICIENCY: ICD-10-CM

## 2021-09-09 DIAGNOSIS — Z01.818 PREOP TESTING: ICD-10-CM

## 2021-09-09 DIAGNOSIS — Z12.5 PROSTATE CANCER SCREENING: ICD-10-CM

## 2021-09-09 DIAGNOSIS — G47.30 SLEEP DISORDER BREATHING: ICD-10-CM

## 2021-09-09 DIAGNOSIS — Z00.00 PREVENTATIVE HEALTH CARE: ICD-10-CM

## 2021-09-09 DIAGNOSIS — Z12.11 COLON CANCER SCREENING: ICD-10-CM

## 2021-09-09 PROCEDURE — 99396 PREV VISIT EST AGE 40-64: CPT | Performed by: INTERNAL MEDICINE

## 2021-09-09 ASSESSMENT — FIBROSIS 4 INDEX: FIB4 SCORE: 1.09

## 2021-09-09 NOTE — PROGRESS NOTES
Subjective     Alexandre Hernández is a 51 y.o. male who presents with Annual Exam            HPI     Here for annual exam  Patient has been having more progressive left shoulder pain and sees Dr. Clark from orthopedics, right handed male.  Previously he has had a right shoulder arthroscopy and rotator cuff repair in 2014, interestingly this is similar to what he is experiencing with his left shoulder, and he is going to have a left shoulder arthroscopy performed by orthopedics.  Last year when Covid started, he had been doing home workouts including push-ups, and over time has noticed that his left shoulder discomfort has progressed.  He does not use any regular anti-inflammatories over-the-counter although he will take those on occasion if necessary but not regularly, no narcotic pain medications.  Continues to do cardiovascular workout and exercise on the treadmill.  The left shoulder pain and discomfort can be limiting with regards to range of motion, causing more discomfort in his activities of daily living and is worse with laying on his left side.  The pain is tolerable and does not at least restrict his work activities or cardiovascular exercise or golfing.  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, does cardiovascular exercise by running on a treadmill 4 days per week 30 minutes to an hour at a time, tries to eat healthy limiting sweets, does hydrate with water drinking seltzer water daily, one diet soda, no regular soda, no coffee, etoh 3 per week, no tobacco, one son age 14 healthy eosinophilic esophagitis, other two children healthy   FH heart disease in father, mother healthy, 2 brothers 1 of whom has ankylosing spondylitis and eosinophilic esophagitis  Sees  cardiology at [facility]  Eye exam none recently, uses contacts for daily wear, no night vision changes  Teeth cleaning twice per year  Uses mouthguard for snoring and sleep disordered breathing, no sleep  apnea  Medications, allergies, medical history, surgical history, social history, family history  reviewed and updated  Asa 81 mg daily  Allegra over-the-counter as needed, occasional use of over-the-counter nasal topical corticosteroids for allergies, no history of asthma, with recent smoke in her area no cough or shortness of breath with exposure  Vit d 5000 units daily  No chest pains with activity, no lightheadedness, no reflux, no constipation, no change in bowel habits, no nocturia, no joint pains in his hip or low back with exercise on the treadmill    Current Outpatient Medications   Medication Sig Dispense Refill   • amoxicillin (AMOXIL) 500 MG Cap      • therapeutic multivitamin-minerals (THERAGRAN-M) Tab Take 1 Tab by mouth every day.     • BABY ASPIRIN PO Take  by mouth.     • vitamin D, Ergocalciferol, (DRISDOL) 16473 UNITS Cap capsule Take  by mouth every 7 days.     • Fexofenadine HCl (ALLEGRA PO) Take  by mouth.     • fluticasone (FLONASE) 50 MCG/ACT nasal spray Spray 1 Spray in nose every day.       No current facility-administered medications for this visit.     Dyslipidemia  2/21/12  [facility] cardiology procedure note cardiac catheterization left main widely patent, LAD large caliber vessel, to diagnose free of disease, circumflex renal disease, 2 marginal branches widely patent, right coronary modest sized nondominant vessel, normal epicardial coronary arteries  9/24/14 chol 235,trig 133,hdl 52,ldl 153,total ldl real 127, apoB 114,Lp(a) 5,pattern B ldl   4/27/16 chol 191,trig 50,hdl 51,ldl 130,LDL-P 1556,HDL-P 26,LP-IR 26; 10 year cardiac risk 2%  2/8/17 chol 244,trig 66,hdl 65,ldl 166  10/2/19 chol 198,trig 152,hdl 48,ldl 120   11/25/19 10-year cardiac risk 3%    History basal cell cancer    History foot abscess  MSSA infection  3/24/16 xray right foot done in [place] soft tissue swelling no fractures  3/24/16 MRI right foot with and without done in [place], no acute fracture,  subluxation, dislocation, focal cortical irregularity base proximal phalanx first digit, no pathologic enhancement, peripheral collection first MTP 1.2 x 2.8 x 2.9 cm, 2.9 cm abscess occipital phalanx right first digit, osteomyelitis cannot be fully excluded  5/17/16 wbc 3.5 (56%N,31%L),ESR 4,CRP 0.4    History of meniscus tear  2/2/17 MRI left knee at [facility] extensive bone contusion anterior aspect medial and lateral tibial plateau with small microtrabecular fracture lateral tibial plateau, subtle tear posterior horn medial meniscus    Neutropenia  4/16/12 wbc 4.6  9/24/14 wbc 3.3 (48%N,38%L)  5/16/16 wbc 3.5 (56%N,31%L)  2/8/17 wbc 4.7  10/2/19 wbc 3.9 (55%N,25%L)     Preventive health  2015 had colon per [facility]   5/16/16 prevnar  7/21/16 pneumovax  10/2/19 psa 0.75  10/4/19 vit d 58  11/25/19 tdap   11/20/20 shingrix first  1/10/21 covid pfizer second    Shoulder pain  7/31/21 MRI left shoulder without, full-thickness tear anterior edge supraspinatus, focal calcification insertion infraspinatus with surrounding soft tissue edema suggestive of calcific tendinitis, tendinopathy of subscapularis and proximal long head biceps tendon, mild to moderate degenerative changes acromioclavicular joint  8/17/21  orthopedic note left shoulder pain x5 years, MRI reveals full-thickness rotator cuff tear, plan left shoulder arthroscopic rotator cuff repair and subacromial decompression    Status post aortic valve replacement  7/13/10 echo EF 60%, aortic valve stenosis peak gradient 74, valve area 0.75  2/20/12  cardiology note progressive aortic stenosis, recommend valve replacement, coronary angiography, patient will visit dr.vince arcos in [facility]  2/21/12  [facility] cardiology procedure note cardiac catheterization left main widely patent, LAD large caliber vessel, diagonal free of disease, circumflex free of disease, 2 marginal branches widely patent, right coronary modest sized nondominant  vessel, normal epicardial coronary arteries  3/12 aortic valve replacement done at [facility]   3/12/12 intraoperative echocardiogram [facility] small PFO intra-atrial septum, mild LVH, EF 55%, aortic valve bicuspid and severely calcified, severe aortic stenosis  9/22/14  cardiology [facility] note repeat labs and echo  9/23/14 echo LVH, mild concentric LVH, EF 60-65%, trace MR, normal functioning bioprosthetic bovine aortic valve, trace aortic insufficiency, trace TR, RVSP 20-25  4/25/16 echo normal LV function, EF 60%  8/18/16 transesophageal echocardiogram bioprosthetic aortic valve, trace aortic insufficiency, no vegetation noted  10/16/19 echo EF 65%, normal bioprosthetic aortic valve functioning normally with normal transvalvular gradients  8/26/21 echo EF 60%, known bioprosthetic aortic valve functioning normally with normal transvalvular gradient, peak gradient 30    Status post Achilles tendon repair 1989 left    Status post left thumb surgery  2006 left thumb surgery    Status post right rotator cuff surgery  8/21/14 MRI right shoulder without; full-thickness distal/anterior supraspinatus tendon tear retraction 7 mm  11/5/14  right shoulder arthroscopy and rotator cuff repair, subacromial decompression    Status post T12 surgery osteoma          Patient Active Problem List   Diagnosis   • S/P aortic valve replacement   • S/P rotator cuff right surgery   • History of foot abscess, right   • S/P Achilles tendon repair   • S/p thumb left surgery   • S/p osteoma T12 surgery   • History of basal cell cancer   • Adverse reaction to contrast media   • Dyslipidemia   • Neutropenia (CMS-HCC)   • Preventative health care   • History of meniscus tear   • Shoulder pain, left                 Health Maintenance Summary                COLORECTAL CANCER SCREENING Overdue 1970     IMM ZOSTER VACCINES Overdue 1/15/2021      Done 11/20/2020 Imm Admin: Zoster Vaccine Recombinant (RZV)  (SHINGRIX)    IMM INFLUENZA Overdue 9/1/2021      Done 10/27/2020 Imm Admin: Influenza Vaccine Quad Inj (Pf)     Patient has more history with this topic...    IMM DTaP/Tdap/Td Vaccine Next Due 11/25/2029      Done 11/25/2019 Imm Admin: Tdap Vaccine          Patient Care Team:  Andre Shen M.D. as PCP - General (Internal Medicine)  '    ROS           Objective          Physical Exam  Vitals and nursing note reviewed.   Constitutional:       Appearance: Normal appearance.   HENT:      Head: Normocephalic and atraumatic.      Right Ear: External ear normal.      Left Ear: External ear normal.   Eyes:      Conjunctiva/sclera: Conjunctivae normal.   Cardiovascular:      Rate and Rhythm: Normal rate and regular rhythm.      Heart sounds: Murmur heard.     Pulmonary:      Effort: Pulmonary effort is normal.      Breath sounds: Normal breath sounds.   Abdominal:      General: There is no distension.   Skin:     General: Skin is warm.   Neurological:      Mental Status: He is alert.   Psychiatric:         Mood and Affect: Mood normal.         Behavior: Behavior normal.       Prostate not enlarged, no nodules       Assessment & Plan        Assessment  #! Annual exam     #2 left shoulder pain, MRI in July showed full-thickness tear of his supraspinatus edge, scheduled for arthroscopy by orthopedics, Dr. Clark, previously had a right shoulder injury and arthroscopic repair    #3 status post aortic valve replacement in 2012 we do not have any operative records [place], followed by cardiology most recent echo 8/26/21 normal EF 60%, bioprosthetic valve functioning normally with normal transvalvular gradient, on aspirin 81 mg daily per cardiology at [facility] Dr. Bowens    #4 mouth guard for snoring as he has sleep disordered breathing which is well controlled with minimal snoring using his mouthguard appliance    #5 dyslipidemia diet controlled    #6 preventive health  2015 had colon per [facility]   5/16/16  prevnar  7/21/16 pneumovax  10/2/19 psa 0.75  10/4/19 vit d 58  11/25/19 tdap   11/20/20 shingrix first  1/10/21 covid pfizer second    #7 history of basal cell carcinoma followed by dermatology, uses sunscreen with sun exposure    Plan  #! Labs ordered    #2 follow up dermatology, continue sunscreen use    #3 old records [facility] regarding his previous colonoscopy    #4 cologuard ordered    #5  We will need the record of his second Shingrix vaccine done at [facility] will contact his pharmacy to obtain that    #6 continue good is good nutrition and cardiovascular exercise program, pending his left shoulder arthroscopy    #7 annual influenza vaccine when available either at work or at his local pharmacy    #8 Covid booster vaccine when recommended by the CDC and FDA, those guidelines have not yet been released    #9 follow-up 1 year    #10 follow-up cardiology, he just had his echocardiogram done

## 2021-09-09 NOTE — TELEPHONE ENCOUNTER
Need old records of his shingrix vaccine series from [facility] on [address], we only have the record of one vaccine on 11/20/2020

## 2021-09-13 ENCOUNTER — HOSPITAL ENCOUNTER (OUTPATIENT)
Dept: LAB | Facility: MEDICAL CENTER | Age: 51
End: 2021-09-13
Attending: INTERNAL MEDICINE
Payer: COMMERCIAL

## 2021-09-13 DIAGNOSIS — E55.9 VITAMIN D DEFICIENCY: ICD-10-CM

## 2021-09-13 DIAGNOSIS — Z12.5 PROSTATE CANCER SCREENING: ICD-10-CM

## 2021-09-13 DIAGNOSIS — Z01.818 PREOP TESTING: ICD-10-CM

## 2021-09-13 DIAGNOSIS — Z00.00 PREVENTATIVE HEALTH CARE: ICD-10-CM

## 2021-09-13 LAB
25(OH)D3 SERPL-MCNC: 79 NG/ML (ref 30–100)
ALBUMIN SERPL BCP-MCNC: 4.5 G/DL (ref 3.2–4.9)
ALBUMIN/GLOB SERPL: 1.5 G/DL
ALP SERPL-CCNC: 91 U/L (ref 30–99)
ALT SERPL-CCNC: 17 U/L (ref 2–50)
AMORPH CRY #/AREA URNS HPF: PRESENT /HPF
ANION GAP SERPL CALC-SCNC: 10 MMOL/L (ref 7–16)
APPEARANCE UR: ABNORMAL
APTT PPP: 31.9 SEC (ref 24.7–36)
AST SERPL-CCNC: 24 U/L (ref 12–45)
BACTERIA #/AREA URNS HPF: ABNORMAL /HPF
BASOPHILS # BLD AUTO: 1.6 % (ref 0–1.8)
BASOPHILS # BLD: 0.05 K/UL (ref 0–0.12)
BILIRUB SERPL-MCNC: 0.2 MG/DL (ref 0.1–1.5)
BILIRUB UR QL STRIP.AUTO: NEGATIVE
BUN SERPL-MCNC: 12 MG/DL (ref 8–22)
CALCIUM SERPL-MCNC: 9.6 MG/DL (ref 8.5–10.5)
CAOX CRY #/AREA URNS HPF: ABNORMAL /HPF
CHLORIDE SERPL-SCNC: 105 MMOL/L (ref 96–112)
CHOLEST SERPL-MCNC: 246 MG/DL (ref 100–199)
CO2 SERPL-SCNC: 25 MMOL/L (ref 20–33)
COLOR UR: YELLOW
CREAT SERPL-MCNC: 1.05 MG/DL (ref 0.5–1.4)
EOSINOPHIL # BLD AUTO: 0.18 K/UL (ref 0–0.51)
EOSINOPHIL NFR BLD: 5.7 % (ref 0–6.9)
EPI CELLS #/AREA URNS HPF: ABNORMAL /HPF
ERYTHROCYTE [DISTWIDTH] IN BLOOD BY AUTOMATED COUNT: 47.2 FL (ref 35.9–50)
EST. AVERAGE GLUCOSE BLD GHB EST-MCNC: 100 MG/DL
GLOBULIN SER CALC-MCNC: 3.1 G/DL (ref 1.9–3.5)
GLUCOSE SERPL-MCNC: 89 MG/DL (ref 65–99)
GLUCOSE UR STRIP.AUTO-MCNC: NEGATIVE MG/DL
HBA1C MFR BLD: 5.1 % (ref 4–5.6)
HCT VFR BLD AUTO: 47.8 % (ref 42–52)
HDLC SERPL-MCNC: 49 MG/DL
HGB BLD-MCNC: 16.1 G/DL (ref 14–18)
IMM GRANULOCYTES # BLD AUTO: 0.01 K/UL (ref 0–0.11)
IMM GRANULOCYTES NFR BLD AUTO: 0.3 % (ref 0–0.9)
INR PPP: 0.99 (ref 0.87–1.13)
KETONES UR STRIP.AUTO-MCNC: NEGATIVE MG/DL
LDLC SERPL CALC-MCNC: 164 MG/DL
LEUKOCYTE ESTERASE UR QL STRIP.AUTO: NEGATIVE
LYMPHOCYTES # BLD AUTO: 0.88 K/UL (ref 1–4.8)
LYMPHOCYTES NFR BLD: 28 % (ref 22–41)
MCH RBC QN AUTO: 33 PG (ref 27–33)
MCHC RBC AUTO-ENTMCNC: 33.7 G/DL (ref 33.7–35.3)
MCV RBC AUTO: 98 FL (ref 81.4–97.8)
MICRO URNS: ABNORMAL
MONOCYTES # BLD AUTO: 0.27 K/UL (ref 0–0.85)
MONOCYTES NFR BLD AUTO: 8.6 % (ref 0–13.4)
NEUTROPHILS # BLD AUTO: 1.75 K/UL (ref 1.82–7.42)
NEUTROPHILS NFR BLD: 55.8 % (ref 44–72)
NITRITE UR QL STRIP.AUTO: NEGATIVE
NRBC # BLD AUTO: 0 K/UL
NRBC BLD-RTO: 0 /100 WBC
PH UR STRIP.AUTO: 5.5 [PH] (ref 5–8)
PLATELET # BLD AUTO: 224 K/UL (ref 164–446)
PMV BLD AUTO: 10.5 FL (ref 9–12.9)
POTASSIUM SERPL-SCNC: 4.4 MMOL/L (ref 3.6–5.5)
PROT SERPL-MCNC: 7.6 G/DL (ref 6–8.2)
PROT UR QL STRIP: NEGATIVE MG/DL
PROTHROMBIN TIME: 12.8 SEC (ref 12–14.6)
PSA SERPL-MCNC: 1.12 NG/ML (ref 0–4)
RBC # BLD AUTO: 4.88 M/UL (ref 4.7–6.1)
RBC UR QL AUTO: NEGATIVE
SODIUM SERPL-SCNC: 140 MMOL/L (ref 135–145)
SP GR UR STRIP.AUTO: >=1.03
TRIGL SERPL-MCNC: 164 MG/DL (ref 0–149)
TSH SERPL DL<=0.005 MIU/L-ACNC: 1.22 UIU/ML (ref 0.38–5.33)
UROBILINOGEN UR STRIP.AUTO-MCNC: 0.2 MG/DL
WBC # BLD AUTO: 3.1 K/UL (ref 4.8–10.8)

## 2021-09-13 PROCEDURE — 81001 URINALYSIS AUTO W/SCOPE: CPT

## 2021-09-13 PROCEDURE — 85025 COMPLETE CBC W/AUTO DIFF WBC: CPT

## 2021-09-13 PROCEDURE — 80061 LIPID PANEL: CPT

## 2021-09-13 PROCEDURE — 83036 HEMOGLOBIN GLYCOSYLATED A1C: CPT

## 2021-09-13 PROCEDURE — 80053 COMPREHEN METABOLIC PANEL: CPT

## 2021-09-13 PROCEDURE — 84153 ASSAY OF PSA TOTAL: CPT

## 2021-09-13 PROCEDURE — 84443 ASSAY THYROID STIM HORMONE: CPT

## 2021-09-13 PROCEDURE — 36415 COLL VENOUS BLD VENIPUNCTURE: CPT

## 2021-09-13 PROCEDURE — 82306 VITAMIN D 25 HYDROXY: CPT

## 2021-09-13 PROCEDURE — 85730 THROMBOPLASTIN TIME PARTIAL: CPT

## 2021-09-13 PROCEDURE — 85610 PROTHROMBIN TIME: CPT

## 2021-09-14 ENCOUNTER — HOSPITAL ENCOUNTER (OUTPATIENT)
Facility: MEDICAL CENTER | Age: 51
End: 2021-09-14
Attending: PATHOLOGY
Payer: COMMERCIAL

## 2021-09-14 ENCOUNTER — TELEPHONE (OUTPATIENT)
Dept: MEDICAL GROUP | Facility: MEDICAL CENTER | Age: 51
End: 2021-09-14

## 2021-09-14 PROBLEM — S46.012A STRAIN OF TENDON OF LEFT ROTATOR CUFF: Status: ACTIVE | Noted: 2021-08-17

## 2021-09-14 LAB
COVID ORDER STATUS COVID19: NORMAL
SARS-COV-2 RNA RESP QL NAA+PROBE: NOTDETECTED
SPECIMEN SOURCE: NORMAL

## 2021-09-14 PROCEDURE — U0003 INFECTIOUS AGENT DETECTION BY NUCLEIC ACID (DNA OR RNA); SEVERE ACUTE RESPIRATORY SYNDROME CORONAVIRUS 2 (SARS-COV-2) (CORONAVIRUS DISEASE [COVID-19]), AMPLIFIED PROBE TECHNIQUE, MAKING USE OF HIGH THROUGHPUT TECHNOLOGIES AS DESCRIBED BY CMS-2020-01-R: HCPCS

## 2021-09-14 PROCEDURE — U0005 INFEC AGEN DETEC AMPLI PROBE: HCPCS

## 2021-09-17 ENCOUNTER — HOSPITAL ENCOUNTER (OUTPATIENT)
Facility: MEDICAL CENTER | Age: 51
End: 2021-09-17
Attending: INTERNAL MEDICINE
Payer: COMMERCIAL

## 2021-09-17 PROCEDURE — 82274 ASSAY TEST FOR BLOOD FECAL: CPT

## 2021-09-20 PROBLEM — Z98.890 PONV (POSTOPERATIVE NAUSEA AND VOMITING): Status: ACTIVE | Noted: 2021-09-20

## 2021-09-20 PROBLEM — R11.2 PONV (POSTOPERATIVE NAUSEA AND VOMITING): Status: ACTIVE | Noted: 2021-09-20

## 2021-09-21 DIAGNOSIS — E78.5 DYSLIPIDEMIA: ICD-10-CM

## 2021-09-21 DIAGNOSIS — Z91.89 OTHER SPECIFIED PERSONAL RISK FACTORS, NOT ELSEWHERE CLASSIFIED: ICD-10-CM

## 2021-09-21 PROBLEM — Z98.890 S/P ARTHROSCOPY OF LEFT SHOULDER: Status: ACTIVE | Noted: 2021-07-31

## 2021-09-23 LAB — IMM ASSAY OCC BLD FITOB: NEGATIVE

## 2021-09-27 ENCOUNTER — TELEPHONE (OUTPATIENT)
Dept: MEDICAL GROUP | Facility: MEDICAL CENTER | Age: 51
End: 2021-09-27

## 2021-09-28 ENCOUNTER — TELEPHONE (OUTPATIENT)
Dept: MEDICAL GROUP | Facility: MEDICAL CENTER | Age: 51
End: 2021-09-28

## 2021-09-28 PROBLEM — S46.012A STRAIN OF TENDON OF LEFT ROTATOR CUFF: Status: RESOLVED | Noted: 2021-08-17 | Resolved: 2021-09-28

## 2021-09-28 NOTE — TELEPHONE ENCOUNTER
----- Message from Andre Shen M.D. sent at 9/27/2021  8:58 PM PDT -----  Please notify the patient that his Cologuard stool test is negative

## 2021-11-16 ENCOUNTER — HOSPITAL ENCOUNTER (OUTPATIENT)
Dept: RADIOLOGY | Facility: MEDICAL CENTER | Age: 51
End: 2021-11-16
Attending: INTERNAL MEDICINE
Payer: COMMERCIAL

## 2021-11-16 DIAGNOSIS — Z91.89 OTHER SPECIFIED PERSONAL RISK FACTORS, NOT ELSEWHERE CLASSIFIED: ICD-10-CM

## 2021-11-16 DIAGNOSIS — E78.5 DYSLIPIDEMIA: ICD-10-CM

## 2021-11-16 PROCEDURE — 4410556 CT-CARDIAC SCORING (SELF PAY ONLY)

## 2021-12-20 ENCOUNTER — APPOINTMENT (RX ONLY)
Dept: URBAN - METROPOLITAN AREA CLINIC 6 | Facility: CLINIC | Age: 51
Setting detail: DERMATOLOGY
End: 2021-12-20

## 2021-12-20 DIAGNOSIS — L81.4 OTHER MELANIN HYPERPIGMENTATION: ICD-10-CM

## 2021-12-20 DIAGNOSIS — Z71.89 OTHER SPECIFIED COUNSELING: ICD-10-CM

## 2021-12-20 DIAGNOSIS — L21.8 OTHER SEBORRHEIC DERMATITIS: ICD-10-CM

## 2021-12-20 DIAGNOSIS — L82.1 OTHER SEBORRHEIC KERATOSIS: ICD-10-CM

## 2021-12-20 DIAGNOSIS — D22 MELANOCYTIC NEVI: ICD-10-CM

## 2021-12-20 DIAGNOSIS — L73.8 OTHER SPECIFIED FOLLICULAR DISORDERS: ICD-10-CM

## 2021-12-20 DIAGNOSIS — D18.0 HEMANGIOMA: ICD-10-CM

## 2021-12-20 DIAGNOSIS — L82.0 INFLAMED SEBORRHEIC KERATOSIS: ICD-10-CM

## 2021-12-20 DIAGNOSIS — Z85.828 PERSONAL HISTORY OF OTHER MALIGNANT NEOPLASM OF SKIN: ICD-10-CM

## 2021-12-20 DIAGNOSIS — L30.0 NUMMULAR DERMATITIS: ICD-10-CM

## 2021-12-20 PROBLEM — D18.01 HEMANGIOMA OF SKIN AND SUBCUTANEOUS TISSUE: Status: ACTIVE | Noted: 2021-12-20

## 2021-12-20 PROBLEM — D22.5 MELANOCYTIC NEVI OF TRUNK: Status: ACTIVE | Noted: 2021-12-20

## 2021-12-20 PROCEDURE — ? LIQUID NITROGEN

## 2021-12-20 PROCEDURE — 17110 DESTRUCTION B9 LES UP TO 14: CPT

## 2021-12-20 PROCEDURE — ? ADDITIONAL NOTES

## 2021-12-20 PROCEDURE — ? PRESCRIPTION

## 2021-12-20 PROCEDURE — 99213 OFFICE O/P EST LOW 20 MIN: CPT | Mod: 25

## 2021-12-20 PROCEDURE — ? COUNSELING

## 2021-12-20 PROCEDURE — ? PRESCRIPTION MEDICATION MANAGEMENT

## 2021-12-20 RX ORDER — CICLOPIROX OLAMINE 7.7 MG/G
CREAM TOPICAL
Qty: 90 | Refills: 1 | Status: ERX

## 2021-12-20 ASSESSMENT — LOCATION DETAILED DESCRIPTION DERM
LOCATION DETAILED: LEFT INFERIOR CENTRAL MALAR CHEEK
LOCATION DETAILED: LEFT CENTRAL EYEBROW
LOCATION DETAILED: LEFT LATERAL MALAR CHEEK
LOCATION DETAILED: LEFT CENTRAL MALAR CHEEK
LOCATION DETAILED: RIGHT MID-UPPER BACK
LOCATION DETAILED: SUPERIOR THORACIC SPINE
LOCATION DETAILED: RIGHT INFERIOR LATERAL MALAR CHEEK
LOCATION DETAILED: PERIUMBILICAL SKIN
LOCATION DETAILED: LEFT NASAL ALA
LOCATION DETAILED: RIGHT LOWER CUTANEOUS LIP
LOCATION DETAILED: RIGHT MEDIAL EYEBROW
LOCATION DETAILED: LEFT DISTAL POSTERIOR THIGH
LOCATION DETAILED: INFERIOR THORACIC SPINE
LOCATION DETAILED: RIGHT MEDIAL MALAR CHEEK
LOCATION DETAILED: STERNUM

## 2021-12-20 ASSESSMENT — LOCATION SIMPLE DESCRIPTION DERM
LOCATION SIMPLE: RIGHT CHEEK
LOCATION SIMPLE: LEFT EYEBROW
LOCATION SIMPLE: LEFT NOSE
LOCATION SIMPLE: UPPER BACK
LOCATION SIMPLE: CHEST
LOCATION SIMPLE: RIGHT UPPER BACK
LOCATION SIMPLE: RIGHT LIP
LOCATION SIMPLE: LEFT CHEEK
LOCATION SIMPLE: ABDOMEN
LOCATION SIMPLE: LEFT POSTERIOR THIGH
LOCATION SIMPLE: RIGHT EYEBROW

## 2021-12-20 ASSESSMENT — LOCATION ZONE DERM
LOCATION ZONE: LIP
LOCATION ZONE: TRUNK
LOCATION ZONE: LEG
LOCATION ZONE: NOSE
LOCATION ZONE: FACE

## 2021-12-20 NOTE — PROCEDURE: COUNSELING
Detail Level: Generalized
Detail Level: Zone
Sunscreen Recommendations: Recommended broad spectrum inorganic or physical sunscreens primarily containing zinc oxide or titanium dioxide.
Detail Level: Simple
Detail Level: Detailed

## 2021-12-20 NOTE — PROCEDURE: LIQUID NITROGEN
Show Topical Anesthesia Variable?: Yes
Number Of Freeze-Thaw Cycles: 2 freeze-thaw cycles
Render Post-Care Instructions In Note?: no
Medical Necessity Information: It is in your best interest to select a reason for this procedure from the list below. All of these items fulfill various CMS LCD requirements except the new and changing color options.
Detail Level: Detailed
Consent: The patient's consent was obtained including but not limited to risks of crusting, scabbing, blistering, scarring, darker or lighter pigmentary change, recurrence, incomplete removal and infection.
Duration Of Freeze Thaw-Cycle (Seconds): 8
Post-Care Instructions: I reviewed with the patient in detail post-care instructions. Patient is to wear sunprotection, and avoid picking at any of the treated lesions. Pt may apply Vaseline to crusted or scabbing areas.
Medical Necessity Clause: This procedure was medically necessary because the lesions that were treated were:

## 2021-12-20 NOTE — PROCEDURE: PRESCRIPTION MEDICATION MANAGEMENT
Detail Level: Zone
Discontinue Regimen: Ketoconazole 2% cream
Initiate Treatment: Ciclopirox 0.77% cream BID PRN and hydrocortisone 1% cream BID PRN
Plan: If lack of improvement or continued recurrence, will prescribe slightly stronger topical steroid such as fluocinolone or alclometasone.
Render In Strict Bullet Format?: No

## 2021-12-20 NOTE — PROCEDURE: ADDITIONAL NOTES
Additional Notes: Recommend use of hydrocortisone 1% cream (rather than ketoconazole cream) followed by CeraVe Moisturizing Cream 1-2 times daily, with at least one application within 3-5 minutes after showering.\\n\\nIf lack of improvement after several months of use, discussed that he should call for stronger topical steroid Rx.
Detail Level: Detailed
Render Risk Assessment In Note?: no

## 2022-10-25 ENCOUNTER — PHARMACY VISIT (OUTPATIENT)
Dept: PHARMACY | Facility: MEDICAL CENTER | Age: 52
End: 2022-10-25
Payer: COMMERCIAL

## 2022-10-25 PROCEDURE — RXMED WILLOW AMBULATORY MEDICATION CHARGE: Performed by: INTERNAL MEDICINE

## 2022-10-25 RX ORDER — INFLUENZA A VIRUS A/BRISBANE/02/2018 IVR-190 (H1N1) ANTIGEN (FORMALDEHYDE INACTIVATED), INFLUENZA A VIRUS A/KANSAS/14/2017 X-327 (H3N2) ANTIGEN (FORMALDEHYDE INACTIVATED), INFLUENZA B VIRUS B/PHUKET/3073/2013 ANTIGEN (FORMALDEHYDE INACTIVATED), AND INFLUENZA B VIRUS B/MARYLAND/15/2016 BX-69A ANTIGEN (FORMALDEHYDE INACTIVATED) 15; 15; 15; 15 UG/.5ML; UG/.5ML; UG/.5ML; UG/.5ML
INJECTION, SUSPENSION INTRAMUSCULAR
Qty: 0.5 ML | Refills: 0 | Status: SHIPPED | OUTPATIENT
Start: 2022-10-25 | End: 2023-01-19

## 2022-10-27 ENCOUNTER — PHARMACY VISIT (OUTPATIENT)
Dept: PHARMACY | Facility: MEDICAL CENTER | Age: 52
End: 2022-10-27
Payer: COMMERCIAL

## 2022-10-27 PROCEDURE — RXMED WILLOW AMBULATORY MEDICATION CHARGE: Performed by: INTERNAL MEDICINE

## 2022-12-14 ENCOUNTER — TELEPHONE (OUTPATIENT)
Dept: MEDICAL GROUP | Facility: MEDICAL CENTER | Age: 52
End: 2022-12-14
Payer: COMMERCIAL

## 2022-12-14 DIAGNOSIS — Z95.2 S/P AORTIC VALVE REPLACEMENT: Chronic | ICD-10-CM

## 2023-01-10 ENCOUNTER — HOSPITAL ENCOUNTER (OUTPATIENT)
Dept: CARDIOLOGY | Facility: MEDICAL CENTER | Age: 53
End: 2023-01-10
Attending: INTERNAL MEDICINE
Payer: COMMERCIAL

## 2023-01-10 ENCOUNTER — TELEPHONE (OUTPATIENT)
Dept: MEDICAL GROUP | Facility: MEDICAL CENTER | Age: 53
End: 2023-01-10

## 2023-01-10 DIAGNOSIS — Z95.2 S/P AORTIC VALVE REPLACEMENT: Chronic | ICD-10-CM

## 2023-01-10 DIAGNOSIS — Z95.2 S/P AVR (AORTIC VALVE REPLACEMENT): ICD-10-CM

## 2023-01-10 DIAGNOSIS — E55.9 VITAMIN D DEFICIENCY: ICD-10-CM

## 2023-01-10 DIAGNOSIS — Z00.00 PREVENTATIVE HEALTH CARE: ICD-10-CM

## 2023-01-10 DIAGNOSIS — Z11.59 NEED FOR HEPATITIS C SCREENING TEST: ICD-10-CM

## 2023-01-10 DIAGNOSIS — D59.8 OTHER ACQUIRED HEMOLYTIC ANEMIAS (HCC): ICD-10-CM

## 2023-01-10 DIAGNOSIS — T82.03XA PARAVALVULAR LEAK OF PROSTHETIC HEART VALVE, INITIAL ENCOUNTER: ICD-10-CM

## 2023-01-10 DIAGNOSIS — E53.8 B12 DEFICIENCY: ICD-10-CM

## 2023-01-10 DIAGNOSIS — Z11.59 NEED FOR HEPATITIS B SCREENING TEST: ICD-10-CM

## 2023-01-10 DIAGNOSIS — E78.5 DYSLIPIDEMIA: Chronic | ICD-10-CM

## 2023-01-10 DIAGNOSIS — Z12.5 PROSTATE CANCER SCREENING: ICD-10-CM

## 2023-01-10 PROBLEM — R11.2 PONV (POSTOPERATIVE NAUSEA AND VOMITING): Status: RESOLVED | Noted: 2021-09-20 | Resolved: 2023-01-10

## 2023-01-10 PROBLEM — Z98.890 PONV (POSTOPERATIVE NAUSEA AND VOMITING): Status: RESOLVED | Noted: 2021-09-20 | Resolved: 2023-01-10

## 2023-01-10 LAB
LV EJECT FRACT  99904: 60
LV EJECT FRACT MOD 2C 99903: 53.98
LV EJECT FRACT MOD 4C 99902: 50.32
LV EJECT FRACT MOD BP 99901: 49.42

## 2023-01-10 PROCEDURE — 93306 TTE W/DOPPLER COMPLETE: CPT | Mod: 26 | Performed by: INTERNAL MEDICINE

## 2023-01-10 PROCEDURE — 93306 TTE W/DOPPLER COMPLETE: CPT

## 2023-01-11 ENCOUNTER — TELEPHONE (OUTPATIENT)
Dept: MEDICAL GROUP | Facility: MEDICAL CENTER | Age: 53
End: 2023-01-11
Payer: COMMERCIAL

## 2023-01-11 ENCOUNTER — TELEPHONE (OUTPATIENT)
Dept: CARDIOLOGY | Facility: MEDICAL CENTER | Age: 53
End: 2023-01-11
Payer: COMMERCIAL

## 2023-01-11 NOTE — TELEPHONE ENCOUNTER
JUAN M oked per Dr. Jean    Patient scheduled for JUAN M w/anesthesia on 1-20-23 with Dr. Jean. Patient has been instructed to check in at 12:00 for 2:00 case time. Message sent to gladis FITCH to Dr. Jean.

## 2023-01-19 ENCOUNTER — PRE-ADMISSION TESTING (OUTPATIENT)
Dept: ADMISSIONS | Facility: MEDICAL CENTER | Age: 53
End: 2023-01-19
Attending: INTERNAL MEDICINE
Payer: COMMERCIAL

## 2023-01-20 ENCOUNTER — DOCUMENTATION (OUTPATIENT)
Dept: CARDIOLOGY | Facility: MEDICAL CENTER | Age: 53
End: 2023-01-20

## 2023-01-20 ENCOUNTER — TELEPHONE (OUTPATIENT)
Dept: CARDIOLOGY | Facility: MEDICAL CENTER | Age: 53
End: 2023-01-20

## 2023-01-20 ENCOUNTER — ANESTHESIA (OUTPATIENT)
Dept: CARDIOLOGY | Facility: MEDICAL CENTER | Age: 53
End: 2023-01-20
Payer: COMMERCIAL

## 2023-01-20 ENCOUNTER — ANESTHESIA EVENT (OUTPATIENT)
Dept: CARDIOLOGY | Facility: MEDICAL CENTER | Age: 53
End: 2023-01-20
Payer: COMMERCIAL

## 2023-01-20 ENCOUNTER — APPOINTMENT (OUTPATIENT)
Dept: CARDIOLOGY | Facility: MEDICAL CENTER | Age: 53
End: 2023-01-20
Attending: INTERNAL MEDICINE
Payer: COMMERCIAL

## 2023-01-20 ENCOUNTER — OFFICE VISIT (OUTPATIENT)
Dept: MEDICAL GROUP | Facility: MEDICAL CENTER | Age: 53
End: 2023-01-20
Payer: COMMERCIAL

## 2023-01-20 ENCOUNTER — HOSPITAL ENCOUNTER (OUTPATIENT)
Facility: MEDICAL CENTER | Age: 53
End: 2023-01-20
Attending: INTERNAL MEDICINE | Admitting: INTERNAL MEDICINE
Payer: COMMERCIAL

## 2023-01-20 VITALS
OXYGEN SATURATION: 96 % | RESPIRATION RATE: 18 BRPM | DIASTOLIC BLOOD PRESSURE: 79 MMHG | HEART RATE: 68 BPM | HEIGHT: 71 IN | SYSTOLIC BLOOD PRESSURE: 167 MMHG | WEIGHT: 171.96 LBS | BODY MASS INDEX: 24.07 KG/M2 | TEMPERATURE: 97.2 F

## 2023-01-20 VITALS
BODY MASS INDEX: 24.08 KG/M2 | SYSTOLIC BLOOD PRESSURE: 124 MMHG | TEMPERATURE: 97.7 F | DIASTOLIC BLOOD PRESSURE: 68 MMHG | HEIGHT: 71 IN | WEIGHT: 172 LBS | HEART RATE: 71 BPM

## 2023-01-20 DIAGNOSIS — Z00.00 PREVENTATIVE HEALTH CARE: Chronic | ICD-10-CM

## 2023-01-20 DIAGNOSIS — E78.5 DYSLIPIDEMIA: Chronic | ICD-10-CM

## 2023-01-20 DIAGNOSIS — I71.21 ANEURYSM OF ASCENDING AORTA WITHOUT RUPTURE (HCC): Chronic | ICD-10-CM

## 2023-01-20 DIAGNOSIS — Z95.2 S/P AORTIC VALVE REPLACEMENT: Chronic | ICD-10-CM

## 2023-01-20 DIAGNOSIS — T82.897D AORTIC PROSTHETIC VALVE REGURGITATION, SUBSEQUENT ENCOUNTER: Chronic | ICD-10-CM

## 2023-01-20 DIAGNOSIS — Z95.2 S/P AVR (AORTIC VALVE REPLACEMENT): ICD-10-CM

## 2023-01-20 DIAGNOSIS — T82.03XA PARAVALVULAR LEAK OF PROSTHETIC HEART VALVE, INITIAL ENCOUNTER: ICD-10-CM

## 2023-01-20 LAB — LV EJECT FRACT  99904: 60

## 2023-01-20 PROCEDURE — 700111 HCHG RX REV CODE 636 W/ 250 OVERRIDE (IP): Performed by: STUDENT IN AN ORGANIZED HEALTH CARE EDUCATION/TRAINING PROGRAM

## 2023-01-20 PROCEDURE — 160035 HCHG PACU - 1ST 60 MINS PHASE I

## 2023-01-20 PROCEDURE — 160046 HCHG PACU - 1ST 60 MINS PHASE II

## 2023-01-20 PROCEDURE — 93312 ECHO TRANSESOPHAGEAL: CPT | Mod: 26 | Performed by: INTERNAL MEDICINE

## 2023-01-20 PROCEDURE — 99396 PREV VISIT EST AGE 40-64: CPT | Performed by: INTERNAL MEDICINE

## 2023-01-20 PROCEDURE — 01922 ANES N-INVAS IMG/RADJ THER: CPT | Performed by: STUDENT IN AN ORGANIZED HEALTH CARE EDUCATION/TRAINING PROGRAM

## 2023-01-20 PROCEDURE — 700105 HCHG RX REV CODE 258: Performed by: INTERNAL MEDICINE

## 2023-01-20 PROCEDURE — 700101 HCHG RX REV CODE 250: Performed by: STUDENT IN AN ORGANIZED HEALTH CARE EDUCATION/TRAINING PROGRAM

## 2023-01-20 PROCEDURE — 4410588 EC-TEE W/O CONT

## 2023-01-20 PROCEDURE — 160002 HCHG RECOVERY MINUTES (STAT)

## 2023-01-20 RX ORDER — SODIUM CHLORIDE 9 MG/ML
INJECTION, SOLUTION INTRAVENOUS ONCE
Status: COMPLETED | OUTPATIENT
Start: 2023-01-20 | End: 2023-01-20

## 2023-01-20 RX ORDER — ROSUVASTATIN CALCIUM 10 MG/1
10 TABLET, COATED ORAL EVERY EVENING
Qty: 100 TABLET | Refills: 3 | Status: SHIPPED
Start: 2023-01-20 | End: 2023-10-28

## 2023-01-20 RX ORDER — LIDOCAINE HYDROCHLORIDE 20 MG/ML
INJECTION, SOLUTION EPIDURAL; INFILTRATION; INTRACAUDAL; PERINEURAL PRN
Status: DISCONTINUED | OUTPATIENT
Start: 2023-01-20 | End: 2023-01-20 | Stop reason: SURG

## 2023-01-20 RX ORDER — DIPHENHYDRAMINE HYDROCHLORIDE 50 MG/ML
12.5 INJECTION INTRAMUSCULAR; INTRAVENOUS
Status: DISCONTINUED | OUTPATIENT
Start: 2023-01-20 | End: 2023-01-20 | Stop reason: HOSPADM

## 2023-01-20 RX ORDER — SODIUM CHLORIDE, SODIUM LACTATE, POTASSIUM CHLORIDE, CALCIUM CHLORIDE 600; 310; 30; 20 MG/100ML; MG/100ML; MG/100ML; MG/100ML
INJECTION, SOLUTION INTRAVENOUS CONTINUOUS
Status: DISCONTINUED | OUTPATIENT
Start: 2023-01-20 | End: 2023-01-20 | Stop reason: HOSPADM

## 2023-01-20 RX ORDER — LABETALOL HYDROCHLORIDE 5 MG/ML
5 INJECTION, SOLUTION INTRAVENOUS
Status: DISCONTINUED | OUTPATIENT
Start: 2023-01-20 | End: 2023-01-20 | Stop reason: HOSPADM

## 2023-01-20 RX ORDER — LIDOCAINE HYDROCHLORIDE 40 MG/ML
SOLUTION TOPICAL PRN
Status: DISCONTINUED | OUTPATIENT
Start: 2023-01-20 | End: 2023-01-20 | Stop reason: SURG

## 2023-01-20 RX ORDER — HYDRALAZINE HYDROCHLORIDE 20 MG/ML
5 INJECTION INTRAMUSCULAR; INTRAVENOUS
Status: DISCONTINUED | OUTPATIENT
Start: 2023-01-20 | End: 2023-01-20 | Stop reason: HOSPADM

## 2023-01-20 RX ORDER — ONDANSETRON 2 MG/ML
4 INJECTION INTRAMUSCULAR; INTRAVENOUS
Status: DISCONTINUED | OUTPATIENT
Start: 2023-01-20 | End: 2023-01-20 | Stop reason: HOSPADM

## 2023-01-20 RX ORDER — HALOPERIDOL 5 MG/ML
1 INJECTION INTRAMUSCULAR
Status: DISCONTINUED | OUTPATIENT
Start: 2023-01-20 | End: 2023-01-20 | Stop reason: HOSPADM

## 2023-01-20 RX ADMIN — PROPOFOL 20 MG: 10 INJECTION, EMULSION INTRAVENOUS at 13:34

## 2023-01-20 RX ADMIN — LIDOCAINE HYDROCHLORIDE 4 ML: 40 SOLUTION TOPICAL at 13:32

## 2023-01-20 RX ADMIN — LIDOCAINE HYDROCHLORIDE 100 MG: 20 INJECTION, SOLUTION EPIDURAL; INFILTRATION; INTRACAUDAL at 13:32

## 2023-01-20 RX ADMIN — PROPOFOL 20 MG: 10 INJECTION, EMULSION INTRAVENOUS at 13:33

## 2023-01-20 RX ADMIN — PROPOFOL 20 MG: 10 INJECTION, EMULSION INTRAVENOUS at 13:35

## 2023-01-20 RX ADMIN — PROPOFOL 50 MG: 10 INJECTION, EMULSION INTRAVENOUS at 13:32

## 2023-01-20 RX ADMIN — SODIUM CHLORIDE: 9 INJECTION, SOLUTION INTRAVENOUS at 13:25

## 2023-01-20 RX ADMIN — PROPOFOL 20 MG: 10 INJECTION, EMULSION INTRAVENOUS at 13:40

## 2023-01-20 ASSESSMENT — FIBROSIS 4 INDEX
FIB4 SCORE: 1.38
FIB4 SCORE: 1.38

## 2023-01-20 ASSESSMENT — PAIN SCALES - GENERAL: PAIN_LEVEL: 0

## 2023-01-20 ASSESSMENT — PATIENT HEALTH QUESTIONNAIRE - PHQ9: CLINICAL INTERPRETATION OF PHQ2 SCORE: 0

## 2023-01-20 NOTE — ANESTHESIA TIME REPORT
Anesthesia Start and Stop Event Times     Date Time Event    1/20/2023 1325 Ready for Procedure     1325 Anesthesia Start     1356 Anesthesia Stop        Responsible Staff  01/20/23    Name Role Begin End    Jani Roque M.D. Anesth 1325 1356        Overtime Reason:  no overtime (within assigned shift)    Comments:

## 2023-01-20 NOTE — PROGRESS NOTES
This patient has been flagged through our echocardiogram surveillance with the following echocardiogram results:    Moderate Aortic Stenosis    Ordering Provider: Dr. Andre Shen    Current Plan of Care for Structural Heart Disease: Added to surveillance tracking list

## 2023-01-20 NOTE — ANESTHESIA PREPROCEDURE EVALUATION
Date/Time: 01/20/23 1400    Scheduled providers: Lucius Jean M.D.; Jani Roque M.D.    Procedure: EC-JUAN M W/O CONT    Diagnosis:       S/P AVR (aortic valve replacement) [Z95.2]      Paravalvular leak of prosthetic heart valve, initial encounter [T82.03XA]      Leakage of heart valve prosthesis, initial encounter [T82.03XA]    Indications: Aortic valve replacement 2012, now with TTE showing mod to severe paravalvular lead and stenosis, cardiology recommended JUAN M    Location: [facility] IM[facility] - ECHOCARDIOLOGY - [facility]          Relevant Problems   CARDIAC   (positive) Aortic prosthetic valve regurgitation       Physical Exam    Airway   Mallampati: I  TM distance: >3 FB  Neck ROM: full       Cardiovascular - normal exam  Rhythm: regular  Rate: normal  (-) murmur     Dental - normal exam           Pulmonary - normal exam  Breath sounds clear to auscultation     Abdominal    Neurological - normal exam                 Anesthesia Plan    ASA 2       Plan - MAC               Induction: intravenous    Postoperative Plan: Postoperative administration of opioids is intended.    Pertinent diagnostic labs and testing reviewed    Informed Consent:    Anesthetic plan and risks discussed with patient.

## 2023-01-20 NOTE — PROGRESS NOTES
Subjective     Alexandre Hernández is a 53 y.o. male who presents with Annual Exam            HPI      Here for follow up echo, patient with a history of porcine aortic valve porcine r, eplacement in 2012 [facility] have been followed by cardiology Dr. Degroot and has been getting periodic echocardiograms to monitor his valve.  Patient continues to keep active, exercise regularly doing weights, cardiovascular training, and is actually training for an upcoming ski trip, performing more aerobic exercises such as jump squats, lunges, then running 3 to 4 miles on a treadmill 10 mile-per-hour pace.  Does not experience any joint pains with his current exercise routine.  No chest pain, shortness of breath, lightheadedness, palpitations.  Tries to keep adequately hydrated with water.  No regular caffeine other than 1 diet soda per day.  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  Has 3 healthy children, is , despite his work schedule is able to achieve 7 to 8 hours of good sleep each night.  Eye exam annually with contacts and glasses, teeth cleaning every 6 months.  No hearing changes  Most recent echo January 10 showed bioprosthetic heart valve showing elevated transvalvular gradient, moderate bioprosthetic stenosis peak gradient 54, moderate to severe intra valvular leak, however he is asymptomatic.  Has an upcoming transesophageal echocardiogram later today.          Current Outpatient Medications   Medication Sig Dispense Refill    VITAMIN D PO Take 5,000 Units by mouth every day.      therapeutic multivitamin-minerals (THERAGRAN-M) Tab Take 1 Tab by mouth every day.      BABY ASPIRIN PO Take 81 mg by mouth every day.      Fexofenadine HCl (ALLEGRA PO) Take 1 Tablet by mouth every day.       No current facility-administered medications for this visit.          Allergic reaction to contrast media during cardiac catheterization severe allergic reaction    Dyslipidemia  2/21/12   [facility] cardiology procedure note cardiac catheterization left main widely patent, LAD large caliber vessel, to diagnose free of disease, circumflex renal disease, 2 marginal branches widely patent, right coronary modest sized nondominant vessel, normal epicardial coronary arteries  9/24/14 chol 235,trig 133,hdl 52,ldl 153,total ldl real 127, apoB 114,Lp(a) 5,pattern B ldl   4/27/16 chol 191,trig 50,hdl 51,ldl 130,LDL-P 1556,HDL-P 26,LP-IR 26; 10 year cardiac risk 2%  2/8/17 chol 244,trig 66,hdl 65,ldl 166  10/2/19 chol 198,trig 152,hdl 48,ldl 120   11/25/19 10-year cardiac risk 3%  9/13/21 chol 246,trig 164,hdl 49,ldl 164; 10 year risk 5.0%  11/6/21 CT cardiac calcium score: LMA 0.0,LCx 0.0, .9, RCA 0.0, PDA 0.0 = 105.9    History of basal cell skin cancer followed by skin cancer dermatology [place]    History of foot right cellullitis  MSSA infection  3/24/16 xray right foot done in [place] soft tissue swelling no fractures  3/24/16 MRI right foot with and without done in [place], no acute fracture, subluxation, dislocation, focal cortical irregularity base proximal phalanx first digit, no pathologic enhancement, peripheral collection first MTP 1.2 x 2.8 x 2.9 cm, 2.9 cm abscess occipital phalanx right first digit, osteomyelitis cannot be fully excluded  5/17/16 wbc 3.5 (56%N,31%L),ESR 4,CRP 0.4    History of knee meniscus tear  2/2/17 MRI left knee at [facility] extensive bone contusion anterior aspect medial and lateral tibial plateau with small microtrabecular fracture lateral tibial plateau, subtle tear posterior horn medial meniscus    Neutropenia  4/16/12 wbc 4.6  9/24/14 wbc 3.3 (48%N,38%L)  5/16/16 wbc 3.5 (56%N,31%L)  2/8/17 wbc 4.7  10/2/19 wbc 3.9 (55%N,25%L)  9/13/21 wbc 3.1 (55.8%N,28%L)    preventative  10/24/14 colon per [facility] small internal hemorrhoids, repeat 10 years  5/16/16 prevnar  7/21/16 pneumovax  11/25/19 tdap   11/20/20 shingrix first  9/13/21 vit d 79  9/13/21 psa 1.1  9/13/21  A1c 5.1%  9/22/21 cologuard negative  10/25/22 flu  10/27/22 covid bivalent    Status post Achilles left tendon repair 1989    Status post aortic valve replacement  6/12/10 echo EF 60%, aortic stenosis peak gradient 74, aortic valve area 0.75  2/1/12  cardiology note echo calculated valve area 0.5, peak gradient 119, progressive aortic stenosis proceed with angiogram  2/20/12  cardiology note progressive aortic stenosis, more shortness of breath with exercise echo last year aortic valve area 1 cm recommend coronary angiography and referral for valve replacement  2/21/12 cardiac catheterization  cardiology substantial allergic reaction to intravenous contrast during anaphylaxis, severe aortic stenosis valve gradient 0.6, left dominant coronary tree, left main widely patent, LAD large caliber vessel with 2 diagonal branches free of significant disease, circumflex large dominant vessel free of disease, marginal branches and posterior descending widely patent, RCA moderate-sized nondominant vessel  3/12/12 intraoperative echocardiogram [facility] small PFO intra-atrial septum, mild LVH, EF 55%, aortic valve bicuspid and severely calcified, severe aortic stenosis  3/12/12  cardiac surgery in [facility] operative note aortic valve replacement, porcine heterograft  9/22/14  cardiology [facility] note repeat labs and echo  9/23/14 echo LVH, mild concentric LVH, EF 60-65%, trace MR, normal functioning bioprosthetic bovine aortic valve, trace aortic insufficiency, trace TR, RVSP 20-25  4/25/16 echo normal LV function, EF 60%  8/18/16 transesophageal echocardiogram bioprosthetic aortic valve, trace aortic insufficiency, no vegetation noted  10/16/19 echo EF 65%, normal bioprosthetic aortic valve functioning normally with normal transvalvular gradients  8/26/21 echo EF 60%, known bioprosthetic aortic valve functioning normally with normal transvalvular  gradient, peak gradient 30  1/10/23 echo EF 60%, normal bioprosthetic heart valve with elevated transvalvular gradient, moderate bioprosthetic stenosis peak gradient 54, moderate to severe intralvalular leak  1/20/23 transesophageal echo, normal bioprosthetic aortic valve, severe prosthetic intra valvular aortic insufficiency, ascending aorta 4.6 x 4.2 cm, moderate prosthetic aortic stenosis, normal LV function EF 60%    Status post T12 osteoma surgery 1990    Status post rotator cuff right surgery  8/21/14 MRI right shoulder without; full-thickness distal/anterior supraspinatus tendon tear retraction 7 mm  11/5/14  right shoulder arthroscopy and rotator cuff repair, subacromial decompression    Status post Shoulder left arthroscopy  7/31/21 MRI left shoulder without, full-thickness tear anterior edge supraspinatus, focal calcification insertion infraspinatus with surrounding soft tissue edema suggestive of calcific tendinitis, tendinopathy of subscapularis and proximal long head biceps tendon, mild to moderate degenerative changes acromioclavicular joint  8/17/21  orthopedic note left shoulder pain x5 years, MRI reveals full-thickness rotator cuff tear, plan left shoulder arthroscopic rotator cuff repair and subacromial decompression  9/20/21  orthopedic operative note left shoulder arthroscopy, rotator cuff repair, subacromial decompression, debridement of glenohumeral joint  9/20/21 x-ray left shoulder reveals well done subacromial decompression with mild calcifications posterior to the humeral head    Status post left thumb surgery  Patient Active Problem List   Diagnosis    S/P aortic valve replacement    S/P rotator cuff right surgery    History of foot abscess, right    S/P Achilles tendon repair    S/p thumb left surgery    S/p osteoma T12 surgery    History of basal cell cancer    Adverse reaction to contrast media    Dyslipidemia    Neutropenia (CMS-HCC)    Preventative health care  "   History of meniscus tear    S/p shoulder left arthroscopy      Sleep disorder breathing     Depression Screening  Little interest or pleasure in doing things?  0 - not at all  Feeling down, depressed , or hopeless? 0 - not at all  Patient Health Questionnaire Score: 0            Patient Care Team:  Andre Shen M.D. as PCP - General (Internal Medicine)      ROS           Objective     /68 (BP Location: Right arm, Patient Position: Sitting, BP Cuff Size: Adult)   Pulse 71   Temp 36.5 °C (97.7 °F)   Ht 1.803 m (5' 11\")   Wt 78 kg (172 lb)   BMI 23.99 kg/m²      Physical Exam  Vitals and nursing note reviewed.   Constitutional:       Appearance: Normal appearance.   HENT:      Head: Normocephalic and atraumatic.      Right Ear: External ear normal.      Left Ear: External ear normal.   Eyes:      Conjunctiva/sclera: Conjunctivae normal.   Cardiovascular:      Rate and Rhythm: Normal rate.      Heart sounds: Murmur heard.   Pulmonary:      Effort: Pulmonary effort is normal.      Breath sounds: Normal breath sounds.   Abdominal:      General: Abdomen is flat. There is no distension.      Palpations: Abdomen is soft.      Tenderness: There is no abdominal tenderness.   Musculoskeletal:         General: No swelling.   Skin:     Findings: No bruising.   Neurological:      General: No focal deficit present.      Mental Status: He is alert.   Psychiatric:         Mood and Affect: Mood normal.         Behavior: Behavior normal.           Assessment & Plan      Assessment  #1 annual exam    #2 status post aortic valve porcine heterograft replacement 2012, most recent echo January 10 elevated transvalvular gradient, moderate bioprosthetic stenosis peak gradient 54, moderate to severe intra valvular leak    #3 elevated CT cardiac calcium score 106 done November 2021      #4 dyslipidemia 9/13/21 chol 246,trig 164,hdl 49,ldl 164; with 10 year risk 5.0% based upon last years results, no hypertension, diabetes, " tobacco    #5 history of basal cell skin cancer followed by dermatology      #6 history of right shoulder arthroscopy, rotator cuff repair 2014 and left shoulder arthroscopy, rotator cuff repair 2021    #7 history of neutropenia WBC in 2021 3.1 no recurrent infections    #8 history of meniscus left knee injury, no limitations on current exercise program    Plan  #1 JUAN M scheduled for today    #2 repeat labs ordered preventative status    #3 old records from his pharmacy regarding his second shingles vaccine, is up-to-date on the influenza and COVID bivalent vaccines    #4 referral back to cardiology follow-up after his JUAN M    #5 continue his good nutrition and exercise program    #6 old records from dermatology    #7 due for repeat colonoscopy 2014    #8 we will call him with his test results

## 2023-01-20 NOTE — OR NURSING
1535: Pt dismissed to home. Pt education given. Pt understanding verbalized. VSS. PIV d/c'd with catheter intact. Pt escorted to front entrance

## 2023-01-20 NOTE — ANESTHESIA POSTPROCEDURE EVALUATION
Patient: Alexandre Hernández    Procedure Summary     Date: 01/20/23 Room / Location: [facility] - ECHOCARDIOLOGY [facility]    Anesthesia Start: 1325 Anesthesia Stop: 1356    Procedure: EC-JUAN M W/O CONT Diagnosis:       S/P AVR (aortic valve replacement)      Paravalvular leak of prosthetic heart valve, initial encounter      Leakage of heart valve prosthesis, initial encounter      (Aortic valve replacement 2012, now with TTE showing mod to severe paravalvular lead and stenosis, cardiology recommended JUAN M)    Scheduled Providers: Lucius Jean M.D.; Jani Roque M.D. Responsible Provider: Jani Roque M.D.    Anesthesia Type: MAC ASA Status: 2          Final Anesthesia Type: MAC  Last vitals  BP   Blood Pressure: (!) 173/78    Temp   36.6 °C (97.8 °F)    Pulse   66   Resp   17    SpO2   94 %      Anesthesia Post Evaluation    Patient location during evaluation: PACU  Patient participation: complete - patient participated  Level of consciousness: awake and alert  Pain score: 0    Airway patency: patent  Anesthetic complications: no  Cardiovascular status: hemodynamically stable  Respiratory status: acceptable  Hydration status: euvolemic    PONV: none          No notable events documented.     Nurse Pain Score: 0 (NPRS)

## 2023-01-20 NOTE — PROGRESS NOTES
I spoke with Dr. Hernández and we reviewed the images of his JUAN M.  Unfortunately it shows likely severe prosthetic valve regurgitation with moderate aortic stenosis based on recent transthoracic gradient.  It also confirms aortopathy which is seen with bicuspid aortic valve.  These findings were not seen on his JUAN M in 2016 in the setting of bacteremia or present to a significant degree in 2019 TTE, so the process has occurred in the last three years. There is no immediate need to do anything given he is not decompensated and has normal LV function but given the degree of aortic insufficiency this will likely have to be addressed.    The 2 options would be  TAVR for aortic insufficiency with plan for eventual redo aortic valve replacement and aorta repair in 10 to 15 years presumably.      2. Otherwise redo aortic valve replacement now surgically with aorta repair.  Typically aorta size of 5.5 cm warrants prophylactic repair.    He will look into potential valve team evaluations whether locally or nationally which is quite reasonable given the complex nature of his concerns.    I have ordered an echocardiogram for 6 months time and we will do an MRA within the year to make sure that the aortic size is stable and encouraged him to reach out to me in the interim with concerns.    He has dyslipidemia and although he has a favorable 10-year risk based on his Smith score I would encourage him to start statin therapy to reduce the risk for coronary disease as this would complicate his treatment in the future and he would likely benefit for more aggressive primary prevention.  I started him on rosuvastatin 10 mg and if tolerated should be increased to LDL<50.    It is my pleasure to participate in the care of Mr. Hernández.  Please do not hesitate to contact me with questions or concerns.    Lucius Jean MD PhD FACC  Cardiologist [facility]    Please note that this dictation was created  using voice recognition software. There may be errors I did not discover before finalizing the note.     1/20/2023  3:38 PM

## 2023-01-20 NOTE — OR NURSING
1353: Pt arrived from cath lab post JUAN M under anesthesia. Pt is awake and oriented. Cardiac rhythm appears to be SR. Pt denies pain or nausea.     1422: Updated pt's friend, Sonido.     1425: Report to ABDIRASHID Macias. Pt to phase II via jailyn with RN.

## 2023-01-20 NOTE — H&P
Patient's PCP: Andre Shen M.D.    CC: abnormal TTE    HPI:  52 yo with AVR recent echo showed AI    Medications / Drug list prior to admission:  No current facility-administered medications on file prior to encounter.     Current Outpatient Medications on File Prior to Encounter   Medication Sig Dispense Refill    therapeutic multivitamin-minerals (THERAGRAN-M) Tab Take 1 Tab by mouth every day.      BABY ASPIRIN PO Take 81 mg by mouth every day.      Fexofenadine HCl (ALLEGRA PO) Take 1 Tablet by mouth every day.         Current list of administered Medications:    Current Facility-Administered Medications:     NS infusion, , Intravenous, Once, Lucius Jean M.D.    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;  Surgeon: [facility];  Location: SURGERY PRE-POST PROC UNIT [facility] [place] [place];  Service:     OTHER Right 01/01/2016    abscess on right foot drained    SHOULDER ARTHROSCOPY W/ ROTATOR CUFF REPAIR  11/05/2014    Performed by Nestor Clark M.D. at SURGERY [facility]    SHOULDER DECOMPRESSION ARTHROSCOPIC  11/05/2014    Performed by Nestor Clark M.D. at SURGERY [facility]    OTHER  03/01/2012    aortic (tissue) valve replacement    ANGIOGRAM  02/01/2012    OTHER  01/01/2004    orif left thumb    ACHILLES TENDON REPAIR  01/01/1990    left    OTHER  01/01/1989    removal of osteoid osteoma (T12)    OTHER CARDIAC  "SURGERY         Family History   Problem Relation Age of Onset    Hypertension Other     Stroke Other     Hyperlipidemia Mother     Hyperlipidemia Father     Heart Disease Father     Heart Disease Maternal Grandmother      Patient family history was personally reviewed, no pertinent family history to current presentation    Social History     Tobacco Use    Smoking status: Never    Smokeless tobacco: Never   Vaping Use    Vaping Use: Never used   Substance Use Topics    Alcohol use: Yes     Comment: Occasional    Drug use: No       ALLERGIES:  Allergies   Allergen Reactions    Contrast Media With Iodine [Iodine] Anaphylaxis    Hydrocodone Rash     Rash      Oxycodone Rash     rash       Review of systems:  A complete review of symptoms was reviewed with patient. This is reviewed in H&P and PMH. ALL OTHERS reviewed and negative    Physical exam:  Patient Vitals for the past 24 hrs:   BP Temp Temp src Pulse Resp SpO2 Height Weight   01/20/23 1256 (!) 173/78 36.6 °C (97.8 °F) Temporal 66 17 94 % 1.803 m (5' 11\") 78 kg (171 lb 15.3 oz)     General: No acute distress.   EYES: no jaundice  HEENT: OP clear   Neck:  No JVD.   CVS:  RRR.   Resp: Normal respiratory effort,   Abdomen: ND,  Skin: Grossly nothing acute no obvious rashes  Neurological: Alert, Moves all extremities  Extremities:    no edema. No cyanosis.       Data:  Laboratory studies personally reviewed by me:  No results found for this or any previous visit (from the past 24 hour(s)).    Imaging:  EC-JUAN M W/O CONT    (Results Pending)         Echocardiogram images personally reviewed by me show TTE shows moderate AI    All pertinent features of laboratory and imaging reviewed including primary images where applicable      Active Problems:    S/P aortic valve replacement (Chronic) POA: Yes      Overview: 7/13/10 echo EF 60%, aortic valve stenosis peak gradient 74, valve area       0.75      2/20/12  cardiology note progressive aortic stenosis, recommend "       valve replacement, coronary angiography, patient will visit dr.vince arcos in [facility]      2/21/12  [facility] cardiology procedure note cardiac       catheterization left main widely patent, LAD large caliber vessel,       diagonal free of disease, circumflex free of disease, 2 marginal branches       widely patent, right coronary modest sized nondominant vessel, normal       epicardial coronary arteries      3/12 aortic valve replacement done at [facility]       3/12/12 intraoperative echocardiogram [facility] small PFO       intra-atrial septum, mild LVH, EF 55%, aortic valve bicuspid and severely       calcified, severe aortic stenosis      9/22/14  cardiology [facility] note repeat labs and echo      9/23/14 echo LVH, mild concentric LVH, EF 60-65%, trace MR, normal       functioning bioprosthetic bovine aortic valve, trace aortic insufficiency,       trace TR, RVSP 20-25      4/25/16 echo normal LV function, EF 60%      8/18/16 transesophageal echocardiogram bioprosthetic aortic valve, trace       aortic insufficiency, no vegetation noted      10/16/19 echo EF 65%, normal bioprosthetic aortic valve functioning       normally with normal transvalvular gradients      8/26/21 echo EF 60%, known bioprosthetic aortic valve functioning normally       with normal transvalvular gradient, peak gradient 30      1/10/23 echo EF 60%, normal bioprosthetic heart valve with elevated       transvalvular gradient, moderate bioprosthetic stenosis peak gradient 54,       moderate to severe paravalvular leak                Aortic prosthetic valve regurgitation (Chronic) POA: Yes  Resolved Problems:    * No resolved hospital problems. *      Assessment / Plan:   JUAN M assess valve function    The risks, benefits, and alternatives to transesophageal echocardiogram (JUAN M) with IV sedation were discussed with the patient in specific detail, including oropharyngeal and esophageal  traumas including hoarseness and dysphagia after the procedure. Rare cases demonstrating serious or fatal complications associated with JUAN M have been reported in the adult population, including cardiac, pulmonary and bleeding complications in less than 1% of people. Patients with an identified intracardiac thrombus are at increased risk for embolic events (absolute risk uncertain, range 0%-38%), and this appears to be reduced with anticoagulation therapy (absolute risk reduction uncertain). The patient verbalized understanding about these possible complications, and wishes to proceed with this procedure.         I personally discussed his case with Anesthesiologist    Future Appointments   Date Time Provider Department Center   1/20/2023  2:00 PM [facility] EXAM 3 JUAN M [facility]       It is my pleasure to participate in the care of Mr. Hernández.  Please do not hesitate to contact me with questions or concerns.    Lucius Jean MD PhD [facility]  Cardiologist [facility]    1/20/2023    Please note that this dictation was created using voice recognition software. There may be errors I did not discover before finalizing the note.

## 2023-01-20 NOTE — DISCHARGE INSTRUCTIONS
HOME CARE INSTRUCTIONS    ACTIVITY: Rest and take it easy for the first 24 hours.  A responsible adult is recommended to remain with you during that time.  It is normal to feel sleepy.  We encourage you to not do anything that requires balance, judgment or coordination.    FOR 24 HOURS DO NOT:  Drive, operate machinery or run household appliances.  Drink beer or alcoholic beverages.  Make important decisions or sign legal documents.    SPECIAL INSTRUCTIONS:             DIET: To avoid nausea, slowly advance diet as tolerated, avoiding spicy or greasy foods for the first day.  Add more substantial food to your diet according to your physician's instructions.  Babies can be fed formula or breast milk as soon as they are hungry.  INCREASE FLUIDS AND FIBER TO AVOID CONSTIPATION.    MEDICATIONS: Resume taking daily medication.  Take prescribed pain medication with food.  If no medication is prescribed, you may take non-aspirin pain medication if needed.  PAIN MEDICATION CAN BE VERY CONSTIPATING.  Take a stool softener or laxative such as senokot, pericolace, or milk of magnesia if needed.    A follow-up appointment should be arranged with your doctor in 1-2 weeks; call to schedule.    You should CALL YOUR PHYSICIAN if you develop:  Fever greater than 101 degrees F.  Pain not relieved by medication, or persistent nausea or vomiting.  Excessive bleeding (blood soaking through dressing) or unexpected drainage from the wound.  Extreme redness or swelling around the incision site, drainage of pus or foul smelling drainage.  Inability to urinate or empty your bladder within 8 hours.  Problems with breathing or chest pain.    You should call 911 if you develop problems with breathing or chest pain.  If you are unable to contact your doctor or surgical center, you should go to the nearest emergency room or urgent care center.  Physician's telephone #: [phone] Dr. Jean    MILD FLU-LIKE SYMPTOMS ARE NORMAL.  YOU MAY  EXPERIENCE GENERALIZED MUSCLE ACHES, THROAT IRRITATION, HEADACHE AND/OR SOME NAUSEA.    If any questions arise, call your doctor.  If your doctor is not available, please feel free to call the Surgical Center at [phone].  The Center is open Monday through Friday from 7AM to 7PM.      A registered nurse may call you a few days after your surgery to see how you are doing after your procedure.    You may also receive a survey in the mail within the next two weeks and we ask that you take a few moments to complete the survey and return it to us.  Our goal is to provide you with very good care and we value your comments.     Depression / Suicide Risk    As you are discharged from this [facility] facility, it is important to learn how to keep safe from harming yourself.    Recognize the warning signs:  Abrupt changes in personality, positive or negative- including increase in energy   Giving away possessions  Change in eating patterns- significant weight changes-  positive or negative  Change in sleeping patterns- unable to sleep or sleeping all the time   Unwillingness or inability to communicate  Depression  Unusual sadness, discouragement and loneliness  Talk of wanting to die  Neglect of personal appearance   Rebelliousness- reckless behavior  Withdrawal from people/activities they love  Confusion- inability to concentrate     If you or a loved one observes any of these behaviors or has concerns about self-harm, here's what you can do:  Talk about it- your feelings and reasons for harming yourself  Remove any means that you might use to hurt yourself (examples: pills, rope, extension cords, firearm)  Get professional help from the community (Mental Health, Substance Abuse, psychological counseling)  Do not be alone:Call your Safe Contact- someone whom you trust who will be there for you.  Call your local CRISIS HOTLINE 867-7549 or 299-049-2479  Call your local Children's Mobile Crisis Response Team [place] (576) 877-8801 or www.[employer]Crackle  Call the toll free National Suicide Prevention Hotlines   National Suicide Prevention Lifeline 619-440-ETZF (7254)  [place] [place] [employer] 800-SUICIDE (590-7071)

## 2023-01-21 ENCOUNTER — TELEPHONE (OUTPATIENT)
Dept: MEDICAL GROUP | Facility: MEDICAL CENTER | Age: 53
End: 2023-01-21
Payer: COMMERCIAL

## 2023-01-21 PROBLEM — M25.569 KNEE MENISCUS PAIN: Status: ACTIVE | Noted: 2023-01-21

## 2023-01-21 NOTE — TELEPHONE ENCOUNTER
Notified with echo result.  He will follow-up with Dr. Jean from cardiology.  Cardiology has started him on rosuvastatin, he will start that after his ski trip, he will have the standard labs done prior to his trip for a baseline status of his cholesterol and liver enzymes prior to initiation of statin therapy.

## 2023-01-22 ENCOUNTER — TELEPHONE (OUTPATIENT)
Dept: MEDICAL GROUP | Facility: MEDICAL CENTER | Age: 53
End: 2023-01-22
Payer: COMMERCIAL

## 2023-01-22 PROBLEM — M25.569 KNEE MENISCUS PAIN: Status: RESOLVED | Noted: 2023-01-21 | Resolved: 2023-01-22

## 2023-01-22 NOTE — LETTER
[facility]  Andre Shen M.D.  [address]  [place] [address]  Fax: [phone]   Authorization for Release/Disclosure of   Protected Health Information   Name: GOMEZ DUNBAR : 1970 SSN: [id number]   Address: [address] [place] [address]  [place] [address] Phone:    [phone] (work)   I authorize the entity listed below to release/disclose the PHI below to:   [facility]/Andre Shen M.D. and Andre Shen M.D.   Provider or Entity Name:                                                         [facility]   Address   City, [facility], [facility]   Phone:      Fax:              [phone]   Reason for request: continuity of care   Information to be released: OLD RECORDS   [  ] LAST COLONOSCOPY,  including any PATH REPORT and follow-up  [  ] LAST FIT/COLOGUARD RESULT [  ] LAST DEXA  [  ] LAST MAMMOGRAM  [  ] LAST PAP  [  ] LAST LABS [  ] RETINA EXAM REPORT  [  ] IMMUNIZATION RECORDS  [ x ] Release all info      [  ] Check here and initial the line next to each item to release ALL health information INCLUDING  _____ Care and treatment for drug and / or alcohol abuse  _____ HIV testing, infection status, or AIDS  _____ Genetic Testing    DATES OF SERVICE OR TIME PERIOD TO BE DISCLOSED: _____________  I understand and acknowledge that:  * This Authorization may be revoked at any time by you in writing, except if your health information has already been used or disclosed.  * Your health information that will be used or disclosed as a result of you signing this authorization could be re-disclosed by the recipient. If this occurs, your re-disclosed health information may no longer be protected by State or Federal laws.  * You may refuse to sign this Authorization. Your refusal will not affect your ability to obtain treatment.  * This Authorization becomes effective upon signing and will  on (date) __________.      If no date is indicated, this Authorization will  one (1) year from the  signature date.    Name: Alexandre Hernández    Signature:                          Continuity of Care   Date:     1/23/2023       PLEASE FAX REQUESTED RECORDS BACK TO: [phone]

## 2023-01-22 NOTE — LETTER
[facility]  Andre Shen M.D.  [address]  [place] [address]  Fax: [phone]   Authorization for Release/Disclosure of   Protected Health Information   Name: GOMEZ DUNBAR : 1970 SSN: [id number]   Address: [address] [place] [address]  [place] [address] Phone:    [phone] (work)   I authorize the entity listed below to release/disclose the PHI below to:   [facility]/Andre Shen M.D. and Andre Shen M.D.   Provider or Entity Name:                                                               [facility])   Address   City, State, Zip   Phone:      Fax:     Reason for request: continuity of care   Information to be released: Shingles vaccine administration record   [  ] LAST COLONOSCOPY,  including any PATH REPORT and follow-up  [  ] LAST FIT/COLOGUARD RESULT [  ] LAST DEXA  [  ] LAST MAMMOGRAM  [  ] LAST PAP  [  ] LAST LABS [  ] RETINA EXAM REPORT  [ x ] IMMUNIZATION RECORDS  [  ] Release all info      [  ] Check here and initial the line next to each item to release ALL health information INCLUDING  _____ Care and treatment for drug and / or alcohol abuse  _____ HIV testing, infection status, or AIDS  _____ Genetic Testing    DATES OF SERVICE OR TIME PERIOD TO BE DISCLOSED: _____________  I understand and acknowledge that:  * This Authorization may be revoked at any time by you in writing, except if your health information has already been used or disclosed.  * Your health information that will be used or disclosed as a result of you signing this authorization could be re-disclosed by the recipient. If this occurs, your re-disclosed health information may no longer be protected by State or Federal laws.  * You may refuse to sign this Authorization. Your refusal will not affect your ability to obtain treatment.  * This Authorization becomes effective upon signing and will  on (date) __________.      If no date is indicated, this Authorization will  one (1)  year from the signature date.    Name: Alexandre Hernández    Signature:                              Continuity of Care    Date:     1/23/2023       PLEASE FAX REQUESTED RECORDS BACK TO: [phone]

## 2023-01-23 NOTE — TELEPHONE ENCOUNTER
(1) Please call [facility] to see if they have the records of his shingles vaccine administration?    (2) Need old records from [facility] [place]

## 2023-01-24 ENCOUNTER — TELEPHONE (OUTPATIENT)
Dept: HEALTH INFORMATION MANAGEMENT | Facility: OTHER | Age: 53
End: 2023-01-24
Payer: COMMERCIAL

## 2023-01-26 ENCOUNTER — APPOINTMENT (RX ONLY)
Dept: URBAN - METROPOLITAN AREA CLINIC 6 | Facility: CLINIC | Age: 53
Setting detail: DERMATOLOGY
End: 2023-01-26

## 2023-01-26 DIAGNOSIS — D18.0 HEMANGIOMA: ICD-10-CM

## 2023-01-26 DIAGNOSIS — L82.1 OTHER SEBORRHEIC KERATOSIS: ICD-10-CM

## 2023-01-26 DIAGNOSIS — L81.4 OTHER MELANIN HYPERPIGMENTATION: ICD-10-CM

## 2023-01-26 DIAGNOSIS — L21.8 OTHER SEBORRHEIC DERMATITIS: ICD-10-CM | Status: WELL CONTROLLED

## 2023-01-26 DIAGNOSIS — B35.1 TINEA UNGUIUM: ICD-10-CM

## 2023-01-26 DIAGNOSIS — Z71.89 OTHER SPECIFIED COUNSELING: ICD-10-CM

## 2023-01-26 DIAGNOSIS — D22 MELANOCYTIC NEVI: ICD-10-CM

## 2023-01-26 DIAGNOSIS — Z85.828 PERSONAL HISTORY OF OTHER MALIGNANT NEOPLASM OF SKIN: ICD-10-CM

## 2023-01-26 DIAGNOSIS — L57.0 ACTINIC KERATOSIS: ICD-10-CM

## 2023-01-26 PROBLEM — D22.5 MELANOCYTIC NEVI OF TRUNK: Status: ACTIVE | Noted: 2023-01-26

## 2023-01-26 PROBLEM — D18.01 HEMANGIOMA OF SKIN AND SUBCUTANEOUS TISSUE: Status: ACTIVE | Noted: 2023-01-26

## 2023-01-26 PROCEDURE — ? LIQUID NITROGEN

## 2023-01-26 PROCEDURE — 17003 DESTRUCT PREMALG LES 2-14: CPT

## 2023-01-26 PROCEDURE — ? PRESCRIPTION

## 2023-01-26 PROCEDURE — 17000 DESTRUCT PREMALG LESION: CPT

## 2023-01-26 PROCEDURE — ? PRESCRIPTION MEDICATION MANAGEMENT

## 2023-01-26 PROCEDURE — ? COUNSELING

## 2023-01-26 PROCEDURE — 99213 OFFICE O/P EST LOW 20 MIN: CPT | Mod: 25

## 2023-01-26 PROCEDURE — ? ADDITIONAL NOTES

## 2023-01-26 RX ORDER — CICLOPIROX OLAMINE 7.7 MG/G
CREAM TOPICAL
Qty: 30 | Refills: 2 | Status: ERX

## 2023-01-26 ASSESSMENT — LOCATION DETAILED DESCRIPTION DERM
LOCATION DETAILED: LEFT CENTRAL MALAR CHEEK
LOCATION DETAILED: RIGHT MEDIAL TRAPEZIAL NECK
LOCATION DETAILED: LEFT PROXIMAL DORSAL FOREARM
LOCATION DETAILED: RIGHT GREAT TOENAIL
LOCATION DETAILED: RIGHT LOWER CUTANEOUS LIP
LOCATION DETAILED: STERNUM
LOCATION DETAILED: RIGHT CAVUM CONCHA
LOCATION DETAILED: LEFT FOREHEAD
LOCATION DETAILED: RIGHT SUPERIOR CENTRAL MALAR CHEEK
LOCATION DETAILED: RIGHT DISTAL DORSAL FOREARM
LOCATION DETAILED: PERIUMBILICAL SKIN
LOCATION DETAILED: RIGHT MEDIAL EYEBROW
LOCATION DETAILED: LEFT CAVUM CONCHA
LOCATION DETAILED: SUPERIOR THORACIC SPINE
LOCATION DETAILED: INFERIOR THORACIC SPINE
LOCATION DETAILED: LEFT NASAL ALA
LOCATION DETAILED: RIGHT MEDIAL MALAR CHEEK
LOCATION DETAILED: LEFT CENTRAL EYEBROW
LOCATION DETAILED: RIGHT MID-UPPER BACK

## 2023-01-26 ASSESSMENT — LOCATION SIMPLE DESCRIPTION DERM
LOCATION SIMPLE: RIGHT GREAT TOE
LOCATION SIMPLE: UPPER BACK
LOCATION SIMPLE: POSTERIOR NECK
LOCATION SIMPLE: LEFT NOSE
LOCATION SIMPLE: LEFT EAR
LOCATION SIMPLE: LEFT EYEBROW
LOCATION SIMPLE: LEFT FOREHEAD
LOCATION SIMPLE: LEFT CHEEK
LOCATION SIMPLE: RIGHT CHEEK
LOCATION SIMPLE: RIGHT EAR
LOCATION SIMPLE: ABDOMEN
LOCATION SIMPLE: RIGHT UPPER BACK
LOCATION SIMPLE: LEFT FOREARM
LOCATION SIMPLE: RIGHT EYEBROW
LOCATION SIMPLE: RIGHT LIP
LOCATION SIMPLE: RIGHT FOREARM
LOCATION SIMPLE: CHEST

## 2023-01-26 ASSESSMENT — LOCATION ZONE DERM
LOCATION ZONE: EAR
LOCATION ZONE: LIP
LOCATION ZONE: TOENAIL
LOCATION ZONE: ARM
LOCATION ZONE: FACE
LOCATION ZONE: NOSE
LOCATION ZONE: NECK
LOCATION ZONE: TRUNK

## 2023-01-26 NOTE — PROCEDURE: PRESCRIPTION MEDICATION MANAGEMENT
Detail Level: Zone
Continue Regimen: Ciclopirox 0.77% cream BID PRN (refill sent in) and hydrocortisone 1% cream BID PRN
Discontinue Regimen: Ketoconazole 2% cream
Plan: Recommended continued use of a facial moisturizer, recommended CeraVe Moisturizing Cream
Render In Strict Bullet Format?: No

## 2023-01-26 NOTE — PROCEDURE: LIQUID NITROGEN
Detail Level: Zone
Post-Care Instructions: I reviewed with the patient in detail post-care instructions. Patient is to wear sunprotection, and avoid picking at any of the treated lesions. Pt may apply Vaseline to crusted or scabbing areas.
Number Of Freeze-Thaw Cycles: 2 freeze-thaw cycles
Render Post-Care Instructions In Note?: no
Consent: The patient's consent was obtained including but not limited to risks of crusting, scabbing, blistering, scarring, darker or lighter pigmentary change, recurrence, incomplete removal and infection.
Show Aperture Variable?: Yes
Duration Of Freeze Thaw-Cycle (Seconds): 8

## 2023-01-26 NOTE — PROCEDURE: COUNSELING
Detail Level: Zone
Detail Level: Generalized
Detail Level: Simple
Sunscreen Recommendations: Recommended broad spectrum inorganic or physical sunscreens primarily containing zinc oxide or titanium dioxide.

## 2023-01-26 NOTE — PROCEDURE: ADDITIONAL NOTES
Additional Notes: Offered treatment recommendations, patient defers at this time.
Render Risk Assessment In Note?: no
Detail Level: Detailed

## 2023-01-27 ENCOUNTER — TELEPHONE (OUTPATIENT)
Dept: CARDIOLOGY | Facility: MEDICAL CENTER | Age: 53
End: 2023-01-27
Payer: COMMERCIAL

## 2023-01-27 ENCOUNTER — HOSPITAL ENCOUNTER (OUTPATIENT)
Dept: LAB | Facility: MEDICAL CENTER | Age: 53
End: 2023-01-27
Attending: INTERNAL MEDICINE
Payer: COMMERCIAL

## 2023-01-27 DIAGNOSIS — Z00.00 PREVENTATIVE HEALTH CARE: ICD-10-CM

## 2023-01-27 DIAGNOSIS — T82.03XA PARAVALVULAR LEAK OF PROSTHETIC HEART VALVE, INITIAL ENCOUNTER: ICD-10-CM

## 2023-01-27 DIAGNOSIS — E55.9 VITAMIN D DEFICIENCY: ICD-10-CM

## 2023-01-27 DIAGNOSIS — I71.21 ANEURYSM OF ASCENDING AORTA WITHOUT RUPTURE (HCC): ICD-10-CM

## 2023-01-27 DIAGNOSIS — D59.8 OTHER ACQUIRED HEMOLYTIC ANEMIAS (HCC): ICD-10-CM

## 2023-01-27 DIAGNOSIS — Z11.59 NEED FOR HEPATITIS B SCREENING TEST: ICD-10-CM

## 2023-01-27 DIAGNOSIS — Z11.59 NEED FOR HEPATITIS C SCREENING TEST: ICD-10-CM

## 2023-01-27 DIAGNOSIS — E53.8 B12 DEFICIENCY: ICD-10-CM

## 2023-01-27 LAB
25(OH)D3 SERPL-MCNC: 66 NG/ML (ref 30–100)
ALBUMIN SERPL BCP-MCNC: 4.7 G/DL (ref 3.2–4.9)
ALBUMIN/GLOB SERPL: 1.7 G/DL
ALP SERPL-CCNC: 96 U/L (ref 30–99)
ALT SERPL-CCNC: 19 U/L (ref 2–50)
ANION GAP SERPL CALC-SCNC: 11 MMOL/L (ref 7–16)
AST SERPL-CCNC: 24 U/L (ref 12–45)
BASOPHILS # BLD AUTO: 1.2 % (ref 0–1.8)
BASOPHILS # BLD: 0.05 K/UL (ref 0–0.12)
BILIRUB SERPL-MCNC: 0.7 MG/DL (ref 0.1–1.5)
BUN SERPL-MCNC: 13 MG/DL (ref 8–22)
CALCIUM ALBUM COR SERPL-MCNC: 8.9 MG/DL (ref 8.5–10.5)
CALCIUM SERPL-MCNC: 9.5 MG/DL (ref 8.5–10.5)
CHLORIDE SERPL-SCNC: 101 MMOL/L (ref 96–112)
CHOLEST SERPL-MCNC: 235 MG/DL (ref 100–199)
CO2 SERPL-SCNC: 26 MMOL/L (ref 20–33)
CREAT SERPL-MCNC: 1.02 MG/DL (ref 0.5–1.4)
EOSINOPHIL # BLD AUTO: 0.1 K/UL (ref 0–0.51)
EOSINOPHIL NFR BLD: 2.5 % (ref 0–6.9)
ERYTHROCYTE [DISTWIDTH] IN BLOOD BY AUTOMATED COUNT: 46.5 FL (ref 35.9–50)
EST. AVERAGE GLUCOSE BLD GHB EST-MCNC: 100 MG/DL
FASTING STATUS PATIENT QL REPORTED: NORMAL
GFR SERPLBLD CREATININE-BSD FMLA CKD-EPI: 88 ML/MIN/1.73 M 2
GLOBULIN SER CALC-MCNC: 2.7 G/DL (ref 1.9–3.5)
GLUCOSE SERPL-MCNC: 92 MG/DL (ref 65–99)
HBA1C MFR BLD: 5.1 % (ref 4–5.6)
HBV SURFACE AB SERPL IA-ACNC: 752 MIU/ML (ref 0–10)
HCT VFR BLD AUTO: 47.4 % (ref 42–52)
HCV AB SER QL: NORMAL
HDLC SERPL-MCNC: 61 MG/DL
HGB BLD-MCNC: 15.9 G/DL (ref 14–18)
HGB RETIC QN AUTO: 37.2 PG/CELL (ref 29–35)
IMM GRANULOCYTES # BLD AUTO: 0.01 K/UL (ref 0–0.11)
IMM GRANULOCYTES NFR BLD AUTO: 0.2 % (ref 0–0.9)
IMM RETICS NFR: 8.6 % (ref 9.3–17.4)
LDH SERPL L TO P-CCNC: 212 U/L (ref 107–266)
LDLC SERPL CALC-MCNC: 159 MG/DL
LYMPHOCYTES # BLD AUTO: 1.09 K/UL (ref 1–4.8)
LYMPHOCYTES NFR BLD: 27.2 % (ref 22–41)
MCH RBC QN AUTO: 33.1 PG (ref 27–33)
MCHC RBC AUTO-ENTMCNC: 33.5 G/DL (ref 33.7–35.3)
MCV RBC AUTO: 98.8 FL (ref 81.4–97.8)
MONOCYTES # BLD AUTO: 0.4 K/UL (ref 0–0.85)
MONOCYTES NFR BLD AUTO: 10 % (ref 0–13.4)
NEUTROPHILS # BLD AUTO: 2.36 K/UL (ref 1.82–7.42)
NEUTROPHILS NFR BLD: 58.9 % (ref 44–72)
NRBC # BLD AUTO: 0 K/UL
NRBC BLD-RTO: 0 /100 WBC
PLATELET # BLD AUTO: 218 K/UL (ref 164–446)
PMV BLD AUTO: 10.3 FL (ref 9–12.9)
POTASSIUM SERPL-SCNC: 4.5 MMOL/L (ref 3.6–5.5)
PROT SERPL-MCNC: 7.4 G/DL (ref 6–8.2)
PSA SERPL-MCNC: 0.86 NG/ML (ref 0–4)
RBC # BLD AUTO: 4.8 M/UL (ref 4.7–6.1)
RETICS # AUTO: 0.05 M/UL (ref 0.04–0.06)
RETICS/RBC NFR: 1.1 % (ref 0.8–2.1)
SODIUM SERPL-SCNC: 138 MMOL/L (ref 135–145)
TRIGL SERPL-MCNC: 76 MG/DL (ref 0–149)
TSH SERPL DL<=0.005 MIU/L-ACNC: 1.11 UIU/ML (ref 0.38–5.33)
VIT B12 SERPL-MCNC: 599 PG/ML (ref 211–911)
WBC # BLD AUTO: 4 K/UL (ref 4.8–10.8)

## 2023-01-27 PROCEDURE — 86706 HEP B SURFACE ANTIBODY: CPT

## 2023-01-27 PROCEDURE — 86803 HEPATITIS C AB TEST: CPT

## 2023-01-27 PROCEDURE — 80061 LIPID PANEL: CPT

## 2023-01-27 PROCEDURE — 82607 VITAMIN B-12: CPT

## 2023-01-27 PROCEDURE — 82306 VITAMIN D 25 HYDROXY: CPT

## 2023-01-27 PROCEDURE — 81001 URINALYSIS AUTO W/SCOPE: CPT

## 2023-01-27 PROCEDURE — 83615 LACTATE (LD) (LDH) ENZYME: CPT

## 2023-01-27 PROCEDURE — 36415 COLL VENOUS BLD VENIPUNCTURE: CPT

## 2023-01-27 PROCEDURE — 83036 HEMOGLOBIN GLYCOSYLATED A1C: CPT

## 2023-01-27 PROCEDURE — 84443 ASSAY THYROID STIM HORMONE: CPT

## 2023-01-27 PROCEDURE — 85025 COMPLETE CBC W/AUTO DIFF WBC: CPT

## 2023-01-27 PROCEDURE — 85046 RETICYTE/HGB CONCENTRATE: CPT

## 2023-01-27 PROCEDURE — 84153 ASSAY OF PSA TOTAL: CPT

## 2023-01-27 PROCEDURE — 80053 COMPREHEN METABOLIC PANEL: CPT

## 2023-01-27 NOTE — TELEPHONE ENCOUNTER
Patient called has been looking into surgery versus TAVR would like a better characterization of the extent of his TAA, did not see arch on his CT calcium score but the visualized portions to the diaphragm are normal of the descending aorta    Ordered MRA and u/s abdomial aorta            It is my pleasure to participate in the care of Mr. Hernández.  Please do not hesitate to contact me with questions or concerns.    Lucius Jean MD PhD [facility]  Cardiologist [facility]    Please note that this dictation was created using voice recognition software. There may be errors I did not discover before finalizing the note.     1/27/2023  2:30 PM

## 2023-01-28 LAB
AMORPH CRY #/AREA URNS HPF: PRESENT /HPF
APPEARANCE UR: ABNORMAL
BACTERIA #/AREA URNS HPF: NEGATIVE /HPF
BILIRUB UR QL STRIP.AUTO: NEGATIVE
COLOR UR: YELLOW
EPI CELLS #/AREA URNS HPF: NEGATIVE /HPF
GLUCOSE UR STRIP.AUTO-MCNC: NEGATIVE MG/DL
HYALINE CASTS #/AREA URNS LPF: ABNORMAL /LPF
KETONES UR STRIP.AUTO-MCNC: NEGATIVE MG/DL
LEUKOCYTE ESTERASE UR QL STRIP.AUTO: NEGATIVE
MICRO URNS: ABNORMAL
NITRITE UR QL STRIP.AUTO: NEGATIVE
PH UR STRIP.AUTO: 6 [PH] (ref 5–8)
PROT UR QL STRIP: NEGATIVE MG/DL
RBC # URNS HPF: ABNORMAL /HPF
RBC UR QL AUTO: NEGATIVE
SP GR UR STRIP.AUTO: >=1.03
UROBILINOGEN UR STRIP.AUTO-MCNC: 0.2 MG/DL
WBC #/AREA URNS HPF: ABNORMAL /HPF

## 2023-01-29 ENCOUNTER — TELEPHONE (OUTPATIENT)
Dept: MEDICAL GROUP | Facility: MEDICAL CENTER | Age: 53
End: 2023-01-29
Payer: COMMERCIAL

## 2023-01-29 DIAGNOSIS — E78.5 DYSLIPIDEMIA: Chronic | ICD-10-CM

## 2023-01-30 NOTE — TELEPHONE ENCOUNTER
See my chart message labs please repeat cholesterol panel and liver enzymes after on rosuvastatin for 4 weeks

## 2023-02-13 ENCOUNTER — HOSPITAL ENCOUNTER (OUTPATIENT)
Dept: RADIOLOGY | Facility: MEDICAL CENTER | Age: 53
End: 2023-02-13
Attending: INTERNAL MEDICINE
Payer: COMMERCIAL

## 2023-02-13 ENCOUNTER — TELEPHONE (OUTPATIENT)
Dept: CARDIOLOGY | Facility: MEDICAL CENTER | Age: 53
End: 2023-02-13
Payer: COMMERCIAL

## 2023-02-13 DIAGNOSIS — I71.21 ANEURYSM OF ASCENDING AORTA WITHOUT RUPTURE (HCC): ICD-10-CM

## 2023-02-13 PROBLEM — I10 HYPERTENSION: Status: ACTIVE | Noted: 2023-02-13

## 2023-02-13 RX ORDER — LOSARTAN POTASSIUM 25 MG/1
25 TABLET ORAL DAILY
Qty: 100 TABLET | Refills: 3 | Status: SHIPPED | OUTPATIENT
Start: 2023-02-13

## 2023-02-13 NOTE — TELEPHONE ENCOUNTER
Patient called unable to do MRA without sedation will do CT    BP has been elevated at times, will start losartan    It is my pleasure to participate in the care of Mr. Hernández.  Please do not hesitate to contact me with questions or concerns.    Lucius Jean MD PhD [facility]  Cardiologist [facility]    Please note that this dictation was created using voice recognition software. There may be errors I did not discover before finalizing the note.     2/13/2023  10:12 AM

## 2023-02-14 ENCOUNTER — HOSPITAL ENCOUNTER (OUTPATIENT)
Dept: RADIOLOGY | Facility: MEDICAL CENTER | Age: 53
End: 2023-02-14
Attending: INTERNAL MEDICINE
Payer: COMMERCIAL

## 2023-02-14 DIAGNOSIS — I71.21 ANEURYSM OF ASCENDING AORTA WITHOUT RUPTURE (HCC): ICD-10-CM

## 2023-02-14 PROCEDURE — 71250 CT THORAX DX C-: CPT

## 2023-02-15 ENCOUNTER — TELEPHONE (OUTPATIENT)
Dept: CARDIOLOGY | Facility: MEDICAL CENTER | Age: 53
End: 2023-02-15
Payer: COMMERCIAL

## 2023-02-16 ENCOUNTER — HOSPITAL ENCOUNTER (OUTPATIENT)
Dept: RADIOLOGY | Facility: MEDICAL CENTER | Age: 53
End: 2023-02-16
Attending: ORTHOPAEDIC SURGERY
Payer: COMMERCIAL

## 2023-02-16 DIAGNOSIS — S83.272A COMPLEX TEAR OF LATERAL MENISCUS OF LEFT KNEE AS CURRENT INJURY, INITIAL ENCOUNTER: ICD-10-CM

## 2023-02-16 PROBLEM — S82.209A TIBIA FRACTURE: Status: ACTIVE | Noted: 2023-02-16

## 2023-02-16 PROCEDURE — 73721 MRI JNT OF LWR EXTRE W/O DYE: CPT

## 2023-02-16 NOTE — TELEPHONE ENCOUNTER
Called pt with result the CAC score did not see distal Ao as well and likely greatest dimension is 4.8 for similar slices the measure is 47 mm  11/16/2021 to 47.7 mm 2/2023.    I called and let pt know

## 2023-02-21 ENCOUNTER — HOSPITAL ENCOUNTER (OUTPATIENT)
Dept: RADIOLOGY | Facility: MEDICAL CENTER | Age: 53
End: 2023-02-21
Attending: INTERNAL MEDICINE
Payer: COMMERCIAL

## 2023-02-21 DIAGNOSIS — I71.21 ANEURYSM OF ASCENDING AORTA WITHOUT RUPTURE (HCC): ICD-10-CM

## 2023-02-21 PROCEDURE — 93978 VASCULAR STUDY: CPT

## 2023-02-21 PROCEDURE — 93978 VASCULAR STUDY: CPT | Mod: 26 | Performed by: INTERNAL MEDICINE

## 2023-02-22 ENCOUNTER — APPOINTMENT (OUTPATIENT)
Dept: RADIOLOGY | Facility: MEDICAL CENTER | Age: 53
End: 2023-02-22
Attending: INTERNAL MEDICINE
Payer: COMMERCIAL

## 2023-04-23 ENCOUNTER — TELEPHONE (OUTPATIENT)
Dept: CARDIOLOGY | Facility: MEDICAL CENTER | Age: 53
End: 2023-04-23
Payer: COMMERCIAL

## 2023-04-23 DIAGNOSIS — Z95.2 S/P AORTIC VALVE REPLACEMENT: ICD-10-CM

## 2023-04-23 DIAGNOSIS — T82.897D AORTIC PROSTHETIC VALVE REGURGITATION, SUBSEQUENT ENCOUNTER: ICD-10-CM

## 2023-04-23 DIAGNOSIS — I71.21 ANEURYSM OF ASCENDING AORTA WITHOUT RUPTURE (HCC): ICD-10-CM

## 2023-04-23 NOTE — TELEPHONE ENCOUNTER
Pt called has discussed with [facility] and prior CT surgeon will continue to monitor TAA and valve function.

## 2023-07-05 ENCOUNTER — HOSPITAL ENCOUNTER (OUTPATIENT)
Dept: CARDIOLOGY | Facility: MEDICAL CENTER | Age: 53
End: 2023-07-05
Attending: INTERNAL MEDICINE
Payer: COMMERCIAL

## 2023-07-05 ENCOUNTER — HOSPITAL ENCOUNTER (OUTPATIENT)
Dept: RADIOLOGY | Facility: MEDICAL CENTER | Age: 53
End: 2023-07-05
Attending: INTERNAL MEDICINE
Payer: COMMERCIAL

## 2023-07-05 DIAGNOSIS — I71.21 ANEURYSM OF ASCENDING AORTA WITHOUT RUPTURE (HCC): Chronic | ICD-10-CM

## 2023-07-05 DIAGNOSIS — I71.21 ANEURYSM OF ASCENDING AORTA WITHOUT RUPTURE (HCC): ICD-10-CM

## 2023-07-05 DIAGNOSIS — Z95.2 S/P AORTIC VALVE REPLACEMENT: ICD-10-CM

## 2023-07-05 DIAGNOSIS — Z95.2 S/P AORTIC VALVE REPLACEMENT: Chronic | ICD-10-CM

## 2023-07-05 DIAGNOSIS — T82.897D AORTIC PROSTHETIC VALVE REGURGITATION, SUBSEQUENT ENCOUNTER: ICD-10-CM

## 2023-07-05 PROCEDURE — 71250 CT THORAX DX C-: CPT

## 2023-07-05 PROCEDURE — 93306 TTE W/DOPPLER COMPLETE: CPT

## 2023-07-06 LAB
LV EJECT FRACT  99904: 60
LV EJECT FRACT MOD 2C 99903: 64.9
LV EJECT FRACT MOD 4C 99902: 43.03
LV EJECT FRACT MOD BP 99901: 54.94

## 2023-07-06 PROCEDURE — 93306 TTE W/DOPPLER COMPLETE: CPT | Mod: 26 | Performed by: INTERNAL MEDICINE

## 2023-07-11 ENCOUNTER — TELEPHONE (OUTPATIENT)
Dept: CARDIOLOGY | Facility: MEDICAL CENTER | Age: 53
End: 2023-07-11
Payer: COMMERCIAL

## 2023-07-11 DIAGNOSIS — Z95.2 S/P AORTIC VALVE REPLACEMENT: ICD-10-CM

## 2023-07-11 DIAGNOSIS — I71.21 ANEURYSM OF ASCENDING AORTA WITHOUT RUPTURE (HCC): ICD-10-CM

## 2023-07-11 DIAGNOSIS — T82.897D AORTIC PROSTHETIC VALVE REGURGITATION, SUBSEQUENT ENCOUNTER: ICD-10-CM

## 2023-07-18 ENCOUNTER — TELEPHONE (OUTPATIENT)
Dept: CARDIOLOGY | Facility: MEDICAL CENTER | Age: 53
End: 2023-07-18
Payer: COMMERCIAL

## 2023-07-18 DIAGNOSIS — Z95.2 S/P AORTIC VALVE REPLACEMENT: Chronic | ICD-10-CM

## 2023-07-18 DIAGNOSIS — I71.21 ANEURYSM OF ASCENDING AORTA WITHOUT RUPTURE (HCC): Chronic | ICD-10-CM

## 2023-07-18 DIAGNOSIS — T82.897D AORTIC PROSTHETIC VALVE REGURGITATION, SUBSEQUENT ENCOUNTER: Chronic | ICD-10-CM

## 2023-07-19 ENCOUNTER — TELEPHONE (OUTPATIENT)
Dept: MEDICAL GROUP | Facility: MEDICAL CENTER | Age: 53
End: 2023-07-19
Payer: COMMERCIAL

## 2023-07-19 NOTE — TELEPHONE ENCOUNTER
Patient message that he would like a referral to Dr. Javon Alvarado who did his surgery for aortic valve replacement in 2012.  Because of his pulmonary with Dr. Alvarado and Dr. Alvarado familiarity specifically with his case, Dr Alvarado is preferred.    Javon Alvarado M.D.  Physician  Provider Summary    Sex Title Provider type   Male MD Physician     Primary Contact Information    Phone Fax E-mail Address   [phone] Not available [address] [place] [address] DR MEJIAS [address] [place] [id number]       It is my pleasure to participate in the care of Mr. Hernández.  Please do not hesitate to contact me with questions or concerns.    Lucius Jean MD PhD [facility]  Cardiologist [facility]    Please note that this dictation was created using voice recognition software. There may be errors I did not discover before finalizing the note.     7/18/2023  8:24 PM

## 2023-08-02 ENCOUNTER — TELEPHONE (OUTPATIENT)
Dept: CARDIOLOGY | Facility: MEDICAL CENTER | Age: 53
End: 2023-08-02
Payer: COMMERCIAL

## 2023-08-02 DIAGNOSIS — T82.897D AORTIC PROSTHETIC VALVE REGURGITATION, SUBSEQUENT ENCOUNTER: ICD-10-CM

## 2023-08-02 DIAGNOSIS — Z95.2 S/P AORTIC VALVE REPLACEMENT: ICD-10-CM

## 2023-08-02 DIAGNOSIS — I71.21 ANEURYSM OF ASCENDING AORTA WITHOUT RUPTURE (HCC): ICD-10-CM

## 2023-08-04 ENCOUNTER — TELEPHONE (OUTPATIENT)
Dept: CARDIOLOGY | Facility: MEDICAL CENTER | Age: 53
End: 2023-08-04
Payer: COMMERCIAL

## 2023-08-04 DIAGNOSIS — I71.21 ANEURYSM OF ASCENDING AORTA WITHOUT RUPTURE (HCC): ICD-10-CM

## 2023-08-04 DIAGNOSIS — T82.897D AORTIC PROSTHETIC VALVE REGURGITATION, SUBSEQUENT ENCOUNTER: ICD-10-CM

## 2023-08-04 DIAGNOSIS — Z95.2 S/P AORTIC VALVE REPLACEMENT: ICD-10-CM

## 2023-08-04 NOTE — TELEPHONE ENCOUNTER
PT called on the phone with [facility] for some reason they require another referral for Dr Alvarado, I placed another referral

## 2023-10-28 ENCOUNTER — HOSPITAL ENCOUNTER (EMERGENCY)
Facility: MEDICAL CENTER | Age: 53
End: 2023-10-28
Attending: EMERGENCY MEDICINE
Payer: COMMERCIAL

## 2023-10-28 ENCOUNTER — APPOINTMENT (OUTPATIENT)
Dept: RADIOLOGY | Facility: MEDICAL CENTER | Age: 53
End: 2023-10-28
Attending: EMERGENCY MEDICINE
Payer: COMMERCIAL

## 2023-10-28 VITALS
RESPIRATION RATE: 20 BRPM | TEMPERATURE: 99 F | HEART RATE: 94 BPM | BODY MASS INDEX: 23.06 KG/M2 | DIASTOLIC BLOOD PRESSURE: 84 MMHG | OXYGEN SATURATION: 97 % | WEIGHT: 164.68 LBS | HEIGHT: 71 IN | SYSTOLIC BLOOD PRESSURE: 134 MMHG

## 2023-10-28 DIAGNOSIS — R70.0 ELEVATED ERYTHROCYTE SEDIMENTATION RATE: ICD-10-CM

## 2023-10-28 DIAGNOSIS — R50.9 FEVER, UNSPECIFIED FEVER CAUSE: ICD-10-CM

## 2023-10-28 DIAGNOSIS — Z95.2 S/P AORTIC VALVE REPLACEMENT: Chronic | ICD-10-CM

## 2023-10-28 LAB
ALBUMIN SERPL BCP-MCNC: 4.1 G/DL (ref 3.2–4.9)
ALBUMIN/GLOB SERPL: 1 G/DL
ALP SERPL-CCNC: 175 U/L (ref 30–99)
ALT SERPL-CCNC: 54 U/L (ref 2–50)
ANION GAP SERPL CALC-SCNC: 17 MMOL/L (ref 7–16)
AST SERPL-CCNC: 36 U/L (ref 12–45)
B PARAP IS1001 DNA NPH QL NAA+NON-PROBE: NOT DETECTED
B PERT.PT PRMT NPH QL NAA+NON-PROBE: NOT DETECTED
BASOPHILS # BLD AUTO: 0.5 % (ref 0–1.8)
BASOPHILS # BLD: 0.05 K/UL (ref 0–0.12)
BILIRUB SERPL-MCNC: 0.6 MG/DL (ref 0.1–1.5)
BUN SERPL-MCNC: 11 MG/DL (ref 8–22)
C PNEUM DNA NPH QL NAA+NON-PROBE: NOT DETECTED
CALCIUM ALBUM COR SERPL-MCNC: 9.5 MG/DL (ref 8.5–10.5)
CALCIUM SERPL-MCNC: 9.6 MG/DL (ref 8.4–10.2)
CHLORIDE SERPL-SCNC: 100 MMOL/L (ref 96–112)
CO2 SERPL-SCNC: 19 MMOL/L (ref 20–33)
CREAT SERPL-MCNC: 1.16 MG/DL (ref 0.5–1.4)
CRP SERPL HS-MCNC: 5.48 MG/DL (ref 0–0.75)
EKG IMPRESSION: NORMAL
EOSINOPHIL # BLD AUTO: 0.15 K/UL (ref 0–0.51)
EOSINOPHIL NFR BLD: 1.4 % (ref 0–6.9)
ERYTHROCYTE [DISTWIDTH] IN BLOOD BY AUTOMATED COUNT: 47.4 FL (ref 35.9–50)
ERYTHROCYTE [SEDIMENTATION RATE] IN BLOOD BY WESTERGREN METHOD: 79 MM/HOUR (ref 0–20)
FLUAV RNA NPH QL NAA+NON-PROBE: NOT DETECTED
FLUAV RNA SPEC QL NAA+PROBE: NEGATIVE
FLUBV RNA NPH QL NAA+NON-PROBE: NOT DETECTED
FLUBV RNA SPEC QL NAA+PROBE: NEGATIVE
GFR SERPLBLD CREATININE-BSD FMLA CKD-EPI: 75 ML/MIN/1.73 M 2
GLOBULIN SER CALC-MCNC: 4.1 G/DL (ref 1.9–3.5)
GLUCOSE SERPL-MCNC: 136 MG/DL (ref 65–99)
HADV DNA NPH QL NAA+NON-PROBE: NOT DETECTED
HCOV 229E RNA NPH QL NAA+NON-PROBE: NOT DETECTED
HCOV HKU1 RNA NPH QL NAA+NON-PROBE: NOT DETECTED
HCOV NL63 RNA NPH QL NAA+NON-PROBE: NOT DETECTED
HCOV OC43 RNA NPH QL NAA+NON-PROBE: NOT DETECTED
HCT VFR BLD AUTO: 37.6 % (ref 42–52)
HGB BLD-MCNC: 12.2 G/DL (ref 14–18)
HMPV RNA NPH QL NAA+NON-PROBE: NOT DETECTED
HPIV1 RNA NPH QL NAA+NON-PROBE: NOT DETECTED
HPIV2 RNA NPH QL NAA+NON-PROBE: NOT DETECTED
HPIV3 RNA NPH QL NAA+NON-PROBE: NOT DETECTED
HPIV4 RNA NPH QL NAA+NON-PROBE: NOT DETECTED
IMM GRANULOCYTES # BLD AUTO: 0.07 K/UL (ref 0–0.11)
IMM GRANULOCYTES NFR BLD AUTO: 0.7 % (ref 0–0.9)
LACTATE SERPL-SCNC: 1.3 MMOL/L (ref 0.5–2)
LACTATE SERPL-SCNC: 2.2 MMOL/L (ref 0.5–2)
LYMPHOCYTES # BLD AUTO: 1.08 K/UL (ref 1–4.8)
LYMPHOCYTES NFR BLD: 10.4 % (ref 22–41)
M PNEUMO DNA NPH QL NAA+NON-PROBE: NOT DETECTED
MCH RBC QN AUTO: 31.9 PG (ref 27–33)
MCHC RBC AUTO-ENTMCNC: 32.4 G/DL (ref 32.3–36.5)
MCV RBC AUTO: 98.2 FL (ref 81.4–97.8)
MONOCYTES # BLD AUTO: 0.53 K/UL (ref 0–0.85)
MONOCYTES NFR BLD AUTO: 5.1 % (ref 0–13.4)
NEUTROPHILS # BLD AUTO: 8.49 K/UL (ref 1.82–7.42)
NEUTROPHILS NFR BLD: 81.9 % (ref 44–72)
NRBC # BLD AUTO: 0 K/UL
NRBC BLD-RTO: 0 /100 WBC (ref 0–0.2)
PLATELET # BLD AUTO: 668 K/UL (ref 164–446)
PMV BLD AUTO: 9.6 FL (ref 9–12.9)
POTASSIUM SERPL-SCNC: 4 MMOL/L (ref 3.6–5.5)
PROCALCITONIN SERPL-MCNC: 0.13 NG/ML
PROT SERPL-MCNC: 8.2 G/DL (ref 6–8.2)
RBC # BLD AUTO: 3.83 M/UL (ref 4.7–6.1)
RSV RNA NPH QL NAA+NON-PROBE: NOT DETECTED
RSV RNA SPEC QL NAA+PROBE: NEGATIVE
RV+EV RNA NPH QL NAA+NON-PROBE: NOT DETECTED
SARS-COV-2 RNA NPH QL NAA+NON-PROBE: NOTDETECTED
SARS-COV-2 RNA RESP QL NAA+PROBE: NOTDETECTED
SODIUM SERPL-SCNC: 136 MMOL/L (ref 135–145)
SPECIMEN SOURCE: NORMAL
WBC # BLD AUTO: 10.4 K/UL (ref 4.8–10.8)

## 2023-10-28 PROCEDURE — 83605 ASSAY OF LACTIC ACID: CPT | Mod: 91

## 2023-10-28 PROCEDURE — 87633 RESP VIRUS 12-25 TARGETS: CPT

## 2023-10-28 PROCEDURE — 85652 RBC SED RATE AUTOMATED: CPT

## 2023-10-28 PROCEDURE — 700111 HCHG RX REV CODE 636 W/ 250 OVERRIDE (IP): Mod: JZ | Performed by: EMERGENCY MEDICINE

## 2023-10-28 PROCEDURE — 36415 COLL VENOUS BLD VENIPUNCTURE: CPT

## 2023-10-28 PROCEDURE — 0241U HCHG SARS-COV-2 COVID-19 NFCT DS RESP RNA 4 TRGT MIC: CPT

## 2023-10-28 PROCEDURE — 87040 BLOOD CULTURE FOR BACTERIA: CPT | Mod: 91

## 2023-10-28 PROCEDURE — 85025 COMPLETE CBC W/AUTO DIFF WBC: CPT

## 2023-10-28 PROCEDURE — 87581 M.PNEUMON DNA AMP PROBE: CPT

## 2023-10-28 PROCEDURE — 87116 MYCOBACTERIA CULTURE: CPT

## 2023-10-28 PROCEDURE — 87486 CHLMYD PNEUM DNA AMP PROBE: CPT

## 2023-10-28 PROCEDURE — 96374 THER/PROPH/DIAG INJ IV PUSH: CPT

## 2023-10-28 PROCEDURE — 80053 COMPREHEN METABOLIC PANEL: CPT

## 2023-10-28 PROCEDURE — 71046 X-RAY EXAM CHEST 2 VIEWS: CPT

## 2023-10-28 PROCEDURE — 86140 C-REACTIVE PROTEIN: CPT

## 2023-10-28 PROCEDURE — 99284 EMERGENCY DEPT VISIT MOD MDM: CPT

## 2023-10-28 PROCEDURE — 84145 PROCALCITONIN (PCT): CPT

## 2023-10-28 PROCEDURE — 93005 ELECTROCARDIOGRAM TRACING: CPT | Performed by: EMERGENCY MEDICINE

## 2023-10-28 PROCEDURE — C9803 HOPD COVID-19 SPEC COLLECT: HCPCS | Performed by: EMERGENCY MEDICINE

## 2023-10-28 PROCEDURE — 87798 DETECT AGENT NOS DNA AMP: CPT | Mod: 91

## 2023-10-28 RX ORDER — CEFTRIAXONE 1 G/1
1000 INJECTION, POWDER, FOR SOLUTION INTRAMUSCULAR; INTRAVENOUS ONCE
Status: COMPLETED | OUTPATIENT
Start: 2023-10-28 | End: 2023-10-28

## 2023-10-28 RX ORDER — APIXABAN 5 MG/1
5 TABLET, FILM COATED ORAL 2 TIMES DAILY
COMMUNITY
Start: 2023-10-25

## 2023-10-28 RX ORDER — CARVEDILOL 3.12 MG/1
3.12 TABLET ORAL 2 TIMES DAILY
COMMUNITY
Start: 2023-09-20

## 2023-10-28 RX ORDER — AMOXICILLIN AND CLAVULANATE POTASSIUM 875; 125 MG/1; MG/1
1 TABLET, FILM COATED ORAL 2 TIMES DAILY
COMMUNITY

## 2023-10-28 RX ADMIN — CEFTRIAXONE SODIUM 1000 MG: 1 INJECTION, POWDER, FOR SOLUTION INTRAMUSCULAR; INTRAVENOUS at 10:42

## 2023-10-28 ASSESSMENT — FIBROSIS 4 INDEX: FIB4 SCORE: 2.98

## 2023-10-28 NOTE — ED NOTES
Med Rec UPDATED and COMPLETE per PATIENT  Allergies Reviewed  Antibiotcs in past 30 days:YES  Anticoagulant in past 14 days:YES  Anticoagulant:ELIQUIS 5 MG Last dose:10/28/2023  Preferred pharmacy:[facility] [address]    Verified with Pharmacy Carvedilol 3.125 mg directions

## 2023-10-28 NOTE — ED PROVIDER NOTES
ED Provider Note    CHIEF COMPLAINT  No chief complaint on file.      EXTERNAL RECORDS REVIEWED  10/18/2023:  Recent hospitalization for repair of aortic aneurysm and aortic valve: right axillary artery cannulation with an 8 mm side arm Terumo Gelweave graft, aortic root replacement with reconstruction and reimplantation of bilateral coronary artery ostial buttons and 27 mm Konect valve graft conduit, Repair of ascending aortic aneurysm, right femoral arterial line, right femoral venous line, endoscopic greater saphenous vein harvest of left thigh greater saphenous vein      HPI/ROS      Alexandre Hernández is a 53 y.o. male who presents with chief complaint of fever.  Patient with a history of aneurysm of ascending aorta with associated aortic valve replacement, patient recently underwent repair and revision of prior aortic prosthetic valve, approximately 1 week ago at [facility].  Surgery was approximately 6 hours, but patient tolerated well.  Patient was doing well postop but did start complaining of some periodic fevers that would typically last for few hours and then abhijeet without intervention.  Patient was evaluated for COVID under the supervision stances and this was negative.  X-ray on 10/23/2023 revealed a possible retrocardiac opacity possibly suggesting pneumonia.  Patient was sent home with Augmentin.  Patient finished his course of Augmentin but his periodic fevers persist.  Patient reports this occurs once a day, he reports he developed a mild frontal headache, body aches, fatigue and malaise and some mild pleuritic chest pain when these occur.  His symptoms entirely resolve despite intervention typically in the evenings.  Patient denies any current chest pain, he denies any significant shortness of breath, he denies any current fever body aches or chills.  He denies any neck pain.  Patient denies any recent travel outside the .  He was on aggressive stool softeners at [facility] and had  some diarrhea following this but has no ongoing diarrhea.  Patient without any associated abdominal pain.  Patient is on Eliquis following his procedure and has not missed any doses.  Patient denies any extremity pain.  Patient denies any orthopnea or lower extremity edema.      PAST MEDICAL HISTORY   has a past medical history of Aneurysm of ascending aorta without rupture - 4.6 cm 2023 (1/20/2023), Aortic prosthetic valve regurgitation, Heart murmur, Heart valve disease, History of basal cell cancer, Hypertension (2/13/2023), Infectious disease, Knee meniscus tear (1/21/2023), and PONV (postoperative nausea and vomiting).    SURGICAL HISTORY   has a past surgical history that includes angiogram (02/01/2012); achilles tendon repair (01/01/1990); shoulder arthroscopy w/ rotator cuff repair (11/05/2014); shoulder decompression arthroscopic (11/05/2014); other (03/01/2012); other (01/01/1989); other (01/01/2004); other (Right, 01/01/2016); recovery (04/25/2016); shldr arthroscop,surg,w/rotat cuff repr (Left, 09/20/2021); and other cardiac surgery.    FAMILY HISTORY  Family History   Problem Relation Age of Onset    Hypertension Other     Stroke Other     Hyperlipidemia Mother     Hyperlipidemia Father     Heart Disease Father     Heart Disease Maternal Grandmother        SOCIAL HISTORY  Social History     Tobacco Use    Smoking status: Never    Smokeless tobacco: Never   Vaping Use    Vaping Use: Never used   Substance and Sexual Activity    Alcohol use: Yes     Comment: Occasional    Drug use: No    Sexual activity: Not on file       CURRENT MEDICATIONS  Home Medications    **Home medications have not yet been reviewed for this encounter**         ALLERGIES  Allergies   Allergen Reactions    Contrast Media With Iodine [Iodine] Anaphylaxis    Hydrocodone Rash     Rash      Oxycodone Rash     rash       PHYSICAL EXAM  VITAL SIGNS: BP (!) 155/90   Pulse 81   Temp 36.5 °C (97.7 °F) (Temporal)   Resp 20   Ht 1.803 m  "(5' 11\")   Wt 74.7 kg (164 lb 10.9 oz)   SpO2 90%   BMI 22.97 kg/m²    Physical Exam  Constitutional:       Appearance: Normal appearance.   HENT:      Head: Normocephalic.      Right Ear: Tympanic membrane normal.      Left Ear: Tympanic membrane normal.      Nose: Nose normal.      Mouth/Throat:      Mouth: Mucous membranes are moist.   Eyes:      Extraocular Movements: Extraocular movements intact.      Pupils: Pupils are equal, round, and reactive to light.   Cardiovascular:      Rate and Rhythm: Normal rate and regular rhythm.      Heart sounds: Murmur heard.      Comments: Surgical scars are clear of any dehiscence, surrounding erythema or significant tenderness to palpation.  There is no associated exudate on compression.  No pain out of proportion.  Pulmonary:      Effort: Pulmonary effort is normal. No respiratory distress.      Breath sounds: Normal breath sounds. No stridor. No wheezing or rales.   Chest:      Chest wall: No tenderness.   Abdominal:      General: Abdomen is flat. There is no distension.      Palpations: Abdomen is soft. There is no mass.      Tenderness: There is no abdominal tenderness.   Musculoskeletal:      Cervical back: Normal range of motion.   Skin:     General: Skin is warm.      Capillary Refill: Capillary refill takes less than 2 seconds.   Neurological:      General: No focal deficit present.      Mental Status: He is alert and oriented to person, place, and time.   Psychiatric:         Mood and Affect: Mood normal.           DIAGNOSTIC STUDIES / PROCEDURES  EKG  I have independently interpreted this EKG  See below    LABS  Results for orders placed or performed during the hospital encounter of 10/28/23   CBC With Differential   Result Value Ref Range    WBC 10.4 4.8 - 10.8 K/uL    RBC 3.83 (L) 4.70 - 6.10 M/uL    Hemoglobin 12.2 (L) 14.0 - 18.0 g/dL    Hematocrit 37.6 (L) 42.0 - 52.0 %    MCV 98.2 (H) 81.4 - 97.8 fL    MCH 31.9 27.0 - 33.0 pg    MCHC 32.4 32.3 - 36.5 g/dL "    RDW 47.4 35.9 - 50.0 fL    Platelet Count 668 (H) 164 - 446 K/uL    MPV 9.6 9.0 - 12.9 fL    Neutrophils-Polys 81.90 (H) 44.00 - 72.00 %    Lymphocytes 10.40 (L) 22.00 - 41.00 %    Monocytes 5.10 0.00 - 13.40 %    Eosinophils 1.40 0.00 - 6.90 %    Basophils 0.50 0.00 - 1.80 %    Immature Granulocytes 0.70 0.00 - 0.90 %    Nucleated RBC 0.00 0.00 - 0.20 /100 WBC    Neutrophils (Absolute) 8.49 (H) 1.82 - 7.42 K/uL    Lymphs (Absolute) 1.08 1.00 - 4.80 K/uL    Monos (Absolute) 0.53 0.00 - 0.85 K/uL    Eos (Absolute) 0.15 0.00 - 0.51 K/uL    Baso (Absolute) 0.05 0.00 - 0.12 K/uL    Immature Granulocytes (abs) 0.07 0.00 - 0.11 K/uL    NRBC (Absolute) 0.00 K/uL   Comp Metabolic Panel   Result Value Ref Range    Sodium 136 135 - 145 mmol/L    Potassium 4.0 3.6 - 5.5 mmol/L    Chloride 100 96 - 112 mmol/L    Co2 19 (L) 20 - 33 mmol/L    Anion Gap 17.0 (H) 7.0 - 16.0    Glucose 136 (H) 65 - 99 mg/dL    Bun 11 8 - 22 mg/dL    Creatinine 1.16 0.50 - 1.40 mg/dL    Calcium 9.6 8.4 - 10.2 mg/dL    Correct Calcium 9.5 8.5 - 10.5 mg/dL    AST(SGOT) 36 12 - 45 U/L    ALT(SGPT) 54 (H) 2 - 50 U/L    Alkaline Phosphatase 175 (H) 30 - 99 U/L    Total Bilirubin 0.6 0.1 - 1.5 mg/dL    Albumin 4.1 3.2 - 4.9 g/dL    Total Protein 8.2 6.0 - 8.2 g/dL    Globulin 4.1 (H) 1.9 - 3.5 g/dL    A-G Ratio 1.0 g/dL   Lactic Acid   Result Value Ref Range    Lactic Acid 2.2 (H) 0.5 - 2.0 mmol/L   Lactic Acid   Result Value Ref Range    Lactic Acid 1.3 0.5 - 2.0 mmol/L   C Reactive Protein Quantitative (Non-Cardiac)   Result Value Ref Range    Stat C-Reactive Protein 5.48 (H) 0.00 - 0.75 mg/dL   Sed Rate   Result Value Ref Range    Sed Rate Westergren 79 (H) 0 - 20 mm/hour   CoV-2, FLU A/B, and RSV by PCR (2-4 Hours CEPHEID) : Collect NP swab in VTM    Specimen: Respirate   Result Value Ref Range    Influenza virus A RNA Negative Negative    Influenza virus B, PCR Negative Negative    RSV, PCR Negative Negative    SARS-CoV-2 by PCR NotDetected      SARS-CoV-2 Source NP Swab    PROCALCITONIN   Result Value Ref Range    Procalcitonin 0.13 <0.25 ng/mL   ESTIMATED GFR   Result Value Ref Range    GFR (CKD-EPI) 75 >60 mL/min/1.73 m 2   EKG   Result Value Ref Range    Report       [facility]    Test Date:  2023-10-28  [facility]     Department: [facility]  MRN:        [id number]                      [facility]  Gender:     Male                         Technician: luis  :        1970                   Requested By:BENITA MARTINEZ  Order #:    [id number]                    Reading MD: Denise Amaya MD    Measurements  Intervals                                Axis  Rate:       99                           P:          44  OR:         192                          QRS:        94  QRSD:       109                          T:          -12  QT:         372  QTc:        478    Interpretive Statements  EKG is sinus rhythm, Q waves are present in the inferior leads, T wave  inversions in the inferior leads, first-degree AV block present, this was  chronic before, the Q waves were also chronic, T wave inversions are new  Electronically Signed On 10- 10:46:43 PDT by Denise Amaya MD           RADIOLOGY  I have independently interpreted the diagnostic imaging associated with this visit and am waiting the final reading from the radiologist.   My preliminary interpretation is as follows: chest X-ray unremarkable, bedside ultrasound results discussed below  Radiologist interpretation:   DX-CHEST-2 VIEWS   Final Result      No acute process.            COURSE & MEDICAL DECISION MAKING        INITIAL ASSESSMENT, COURSE AND PLAN  Care Narrative: Very well-appearing patient here with symptoms of undulating fever after recent complex cardiac surgery.  Patient currently is afebrile.  He has some minimal borderline tachycardia between 98 and 110.  Patient wounds are inconsistent with infection.  Patient  abdominal exam is entirely benign.  Certainly given patient's recent valve replacement endocarditis is considered and therefore we will check basic labs including blood cultures.  Patient certainly is at risk for pulmonary embolus though he is already anticoagulated on Eliquis.  We discussed checking a CTA but patient currently believes that this is unlikely and even if he has a small blood clot unlikely to change his treatment course as he does not want to be dual anticoagulated.  Patient case discussed with his cardiac surgeon at [place].  She leaves symptoms are likely secondary viral illness.  We have sent COVID flu and RSV and are awaiting results.  Patient's white count is not significant elevated.  Patient's inflammatory markers are, his ESR, CRP and platelet counts are all elevated, it is difficult to make any sweeping conclusions from this in the setting of patient's recent surgery.  I discussed these elevations with patient's cardiac surgeon who is not particularly concerned regarding this.  Patient had a bedside ultrasound, his echocardiogram fails to reveal any evidence of large pericardial effusion, he has a normal gallbladder with no associated gallbladder wall thickening.  Patient viral testing is negative.  I again discussed the case with the patient's cardiologist, they will follow patient closely.  I discussed options with patient, we discussed admission with IV antibiotics and ongoing monitoring, checking CT scans chest abdomen pelvis to evaluate for possible occult infection, expanding antibiotic coverage.  We decided to give a dose of ceftriaxone, patient is very adamant that he return home.  Patient is highly educated, he is a physician himself, he has a very good understanding of risks and benefits of this plan and I am comfortable discharging him home, he is very reliable.  Patient urinalysis was not collected, we discussed collecting this.  He reports that while he was inpatient at the [place]  facility they checked this multiple times and it was negative, he does not have any associated dysuria urgency or frequency and does not currently want to provide any urinalysis which I am comfortable with.  Patient discharged home in good condition.        DISPOSITION AND DISCUSSIONS  I have discussed management of the patient with the following physicians and POOJA's: Patient's cardiac surgeon    Escalation of care considered, and ultimately not performed:after discussion with the patient / family, they have elected to decline an escalation in care        FINAL DIAGNOSIS  1. Fever, unspecified fever cause    2. Elevated erythrocyte sedimentation rate

## 2023-11-02 ENCOUNTER — HOSPITAL ENCOUNTER (OUTPATIENT)
Dept: RADIOLOGY | Facility: MEDICAL CENTER | Age: 53
End: 2023-11-02
Attending: EMERGENCY MEDICINE
Payer: COMMERCIAL

## 2023-11-02 ENCOUNTER — HOSPITAL ENCOUNTER (OUTPATIENT)
Dept: LAB | Facility: MEDICAL CENTER | Age: 53
End: 2023-11-02
Attending: FAMILY MEDICINE
Payer: COMMERCIAL

## 2023-11-02 DIAGNOSIS — Z95.2 S/P AORTIC VALVE REPLACEMENT: Chronic | ICD-10-CM

## 2023-11-02 LAB
ANION GAP SERPL CALC-SCNC: 12 MMOL/L (ref 7–16)
BACTERIA BLD CULT: NORMAL
BASOPHILS # BLD AUTO: 0.9 % (ref 0–1.8)
BASOPHILS # BLD: 0.05 K/UL (ref 0–0.12)
BUN SERPL-MCNC: 9 MG/DL (ref 8–22)
CALCIUM SERPL-MCNC: 9.8 MG/DL (ref 8.5–10.5)
CHLORIDE SERPL-SCNC: 100 MMOL/L (ref 96–112)
CO2 SERPL-SCNC: 23 MMOL/L (ref 20–33)
CREAT SERPL-MCNC: 0.86 MG/DL (ref 0.5–1.4)
EOSINOPHIL # BLD AUTO: 0.1 K/UL (ref 0–0.51)
EOSINOPHIL NFR BLD: 1.7 % (ref 0–6.9)
ERYTHROCYTE [DISTWIDTH] IN BLOOD BY AUTOMATED COUNT: 46.8 FL (ref 35.9–50)
FASTING STATUS PATIENT QL REPORTED: NORMAL
GFR SERPLBLD CREATININE-BSD FMLA CKD-EPI: 103 ML/MIN/1.73 M 2
GLUCOSE SERPL-MCNC: 93 MG/DL (ref 65–99)
HCT VFR BLD AUTO: 34.3 % (ref 42–52)
HGB BLD-MCNC: 10.6 G/DL (ref 14–18)
IMM GRANULOCYTES # BLD AUTO: 0.02 K/UL (ref 0–0.11)
IMM GRANULOCYTES NFR BLD AUTO: 0.3 % (ref 0–0.9)
LYMPHOCYTES # BLD AUTO: 1.13 K/UL (ref 1–4.8)
LYMPHOCYTES NFR BLD: 19.7 % (ref 22–41)
MCH RBC QN AUTO: 30.3 PG (ref 27–33)
MCHC RBC AUTO-ENTMCNC: 30.9 G/DL (ref 32.3–36.5)
MCV RBC AUTO: 98 FL (ref 81.4–97.8)
MONOCYTES # BLD AUTO: 0.6 K/UL (ref 0–0.85)
MONOCYTES NFR BLD AUTO: 10.4 % (ref 0–13.4)
NEUTROPHILS # BLD AUTO: 3.85 K/UL (ref 1.82–7.42)
NEUTROPHILS NFR BLD: 67 % (ref 44–72)
NRBC # BLD AUTO: 0 K/UL
NRBC BLD-RTO: 0 /100 WBC (ref 0–0.2)
PLATELET # BLD AUTO: 703 K/UL (ref 164–446)
PMV BLD AUTO: 9.2 FL (ref 9–12.9)
POTASSIUM SERPL-SCNC: 4.2 MMOL/L (ref 3.6–5.5)
RBC # BLD AUTO: 3.5 M/UL (ref 4.7–6.1)
SIGNIFICANT IND 70042: NORMAL
SITE SITE: NORMAL
SODIUM SERPL-SCNC: 135 MMOL/L (ref 135–145)
SOURCE SOURCE: NORMAL
WBC # BLD AUTO: 5.8 K/UL (ref 4.8–10.8)

## 2023-11-02 PROCEDURE — 36415 COLL VENOUS BLD VENIPUNCTURE: CPT

## 2023-11-02 PROCEDURE — 71046 X-RAY EXAM CHEST 2 VIEWS: CPT

## 2023-11-02 PROCEDURE — 80048 BASIC METABOLIC PNL TOTAL CA: CPT

## 2023-11-02 PROCEDURE — 85025 COMPLETE CBC W/AUTO DIFF WBC: CPT

## 2023-11-17 ENCOUNTER — HOSPITAL ENCOUNTER (OUTPATIENT)
Dept: RADIOLOGY | Facility: MEDICAL CENTER | Age: 53
End: 2023-11-17
Attending: PHYSICIAN ASSISTANT
Payer: COMMERCIAL

## 2023-11-17 DIAGNOSIS — Z95.2 HEART VALVE REPLACED BY TRANSPLANT: ICD-10-CM

## 2023-11-17 DIAGNOSIS — Z98.890 PERSONAL HISTORY OF SURGERY TO HEART AND GREAT VESSELS, PRESENTING HAZARDS TO HEALTH: ICD-10-CM

## 2023-11-17 DIAGNOSIS — Z86.79 PERSONAL HISTORY OF CARDIOVASCULAR DISORDER: ICD-10-CM

## 2023-11-17 PROCEDURE — 71250 CT THORAX DX C-: CPT

## 2023-11-28 ENCOUNTER — APPOINTMENT (OUTPATIENT)
Dept: RADIOLOGY | Facility: MEDICAL CENTER | Age: 53
End: 2023-11-28
Payer: COMMERCIAL

## 2023-12-11 ENCOUNTER — APPOINTMENT (RX ONLY)
Dept: URBAN - METROPOLITAN AREA CLINIC 6 | Facility: CLINIC | Age: 53
Setting detail: DERMATOLOGY
End: 2023-12-11

## 2023-12-11 ENCOUNTER — RX ONLY (OUTPATIENT)
Age: 53
Setting detail: RX ONLY
End: 2023-12-11

## 2023-12-11 DIAGNOSIS — D22 MELANOCYTIC NEVI: ICD-10-CM

## 2023-12-11 DIAGNOSIS — L57.0 ACTINIC KERATOSIS: ICD-10-CM

## 2023-12-11 DIAGNOSIS — L82.1 OTHER SEBORRHEIC KERATOSIS: ICD-10-CM

## 2023-12-11 DIAGNOSIS — L21.8 OTHER SEBORRHEIC DERMATITIS: ICD-10-CM | Status: WELL CONTROLLED

## 2023-12-11 DIAGNOSIS — L73.8 OTHER SPECIFIED FOLLICULAR DISORDERS: ICD-10-CM

## 2023-12-11 DIAGNOSIS — L81.4 OTHER MELANIN HYPERPIGMENTATION: ICD-10-CM

## 2023-12-11 DIAGNOSIS — Z85.828 PERSONAL HISTORY OF OTHER MALIGNANT NEOPLASM OF SKIN: ICD-10-CM

## 2023-12-11 DIAGNOSIS — L85.3 XEROSIS CUTIS: ICD-10-CM

## 2023-12-11 DIAGNOSIS — Z71.89 OTHER SPECIFIED COUNSELING: ICD-10-CM

## 2023-12-11 DIAGNOSIS — D18.0 HEMANGIOMA: ICD-10-CM

## 2023-12-11 PROBLEM — D22.5 MELANOCYTIC NEVI OF TRUNK: Status: ACTIVE | Noted: 2023-12-11

## 2023-12-11 PROBLEM — D18.01 HEMANGIOMA OF SKIN AND SUBCUTANEOUS TISSUE: Status: ACTIVE | Noted: 2023-12-11

## 2023-12-11 LAB
MYCOBACTERIUM BLD CULT: NORMAL
SIGNIFICANT IND 70042: NORMAL
SITE SITE: NORMAL
SOURCE SOURCE: NORMAL

## 2023-12-11 PROCEDURE — 17000 DESTRUCT PREMALG LESION: CPT

## 2023-12-11 PROCEDURE — 99213 OFFICE O/P EST LOW 20 MIN: CPT | Mod: 25

## 2023-12-11 PROCEDURE — ? PRESCRIPTION MEDICATION MANAGEMENT

## 2023-12-11 PROCEDURE — ? SUNSCREEN RECOMMENDATIONS

## 2023-12-11 PROCEDURE — ? LIQUID NITROGEN

## 2023-12-11 PROCEDURE — 17003 DESTRUCT PREMALG LES 2-14: CPT

## 2023-12-11 PROCEDURE — ? COUNSELING

## 2023-12-11 RX ORDER — CICLOPIROX OLAMINE 7.7 MG/G
CREAM TOPICAL
Qty: 30 | Refills: 2 | Status: ERX | COMMUNITY
Start: 2023-12-11

## 2023-12-11 ASSESSMENT — LOCATION SIMPLE DESCRIPTION DERM
LOCATION SIMPLE: LEFT FOREHEAD
LOCATION SIMPLE: LEFT EAR
LOCATION SIMPLE: RIGHT UPPER BACK
LOCATION SIMPLE: LEFT NOSE
LOCATION SIMPLE: LEFT UPPER BACK
LOCATION SIMPLE: LEFT CHEEK
LOCATION SIMPLE: RIGHT EYEBROW
LOCATION SIMPLE: UPPER BACK
LOCATION SIMPLE: LEFT EYEBROW
LOCATION SIMPLE: ABDOMEN
LOCATION SIMPLE: CHEST
LOCATION SIMPLE: RIGHT CHEEK
LOCATION SIMPLE: RIGHT LIP
LOCATION SIMPLE: RIGHT EAR

## 2023-12-11 ASSESSMENT — LOCATION ZONE DERM
LOCATION ZONE: EAR
LOCATION ZONE: NOSE
LOCATION ZONE: LIP
LOCATION ZONE: FACE
LOCATION ZONE: TRUNK

## 2023-12-11 ASSESSMENT — LOCATION DETAILED DESCRIPTION DERM
LOCATION DETAILED: RIGHT MID-UPPER BACK
LOCATION DETAILED: RIGHT MEDIAL EYEBROW
LOCATION DETAILED: LEFT CENTRAL MALAR CHEEK
LOCATION DETAILED: PERIUMBILICAL SKIN
LOCATION DETAILED: LEFT INFERIOR UPPER BACK
LOCATION DETAILED: LEFT NASAL ALA
LOCATION DETAILED: RIGHT CAVUM CONCHA
LOCATION DETAILED: LEFT CENTRAL EYEBROW
LOCATION DETAILED: RIGHT LOWER CUTANEOUS LIP
LOCATION DETAILED: LEFT INFERIOR MEDIAL FOREHEAD
LOCATION DETAILED: LEFT SUPERIOR PREAURICULAR CHEEK
LOCATION DETAILED: RIGHT MEDIAL MALAR CHEEK
LOCATION DETAILED: SUPERIOR THORACIC SPINE
LOCATION DETAILED: LEFT SUPERIOR LATERAL MALAR CHEEK
LOCATION DETAILED: LEFT CAVUM CONCHA
LOCATION DETAILED: STERNUM

## 2023-12-11 NOTE — PROCEDURE: LIQUID NITROGEN
Render Post-Care Instructions In Note?: no
Post-Care Instructions: I reviewed with the patient in detail post-care instructions. Patient is to wear sunprotection, and avoid picking at any of the treated lesions. Pt may apply Vaseline to crusted or scabbing areas.
Consent: The patient's consent was obtained including but not limited to risks of crusting, scabbing, blistering, scarring, darker or lighter pigmentary change, recurrence, incomplete removal and infection.
Show Applicator Variable?: Yes
Detail Level: Detailed
Number Of Freeze-Thaw Cycles: 2 freeze-thaw cycles
Duration Of Freeze Thaw-Cycle (Seconds): 8

## 2023-12-11 NOTE — PROCEDURE: COUNSELING
Detail Level: Generalized
Detail Level: Simple
Sunscreen Recommendations: Recommended broad spectrum inorganic or physical sunscreens primarily containing zinc oxide or titanium dioxide.
Detail Level: Zone

## 2024-02-08 ENCOUNTER — HOSPITAL ENCOUNTER (EMERGENCY)
Facility: MEDICAL CENTER | Age: 54
End: 2024-02-08
Attending: EMERGENCY MEDICINE
Payer: COMMERCIAL

## 2024-02-08 VITALS
DIASTOLIC BLOOD PRESSURE: 90 MMHG | RESPIRATION RATE: 14 BRPM | OXYGEN SATURATION: 98 % | HEART RATE: 60 BPM | TEMPERATURE: 98 F | SYSTOLIC BLOOD PRESSURE: 150 MMHG

## 2024-02-08 DIAGNOSIS — R04.0 EPISTAXIS: ICD-10-CM

## 2024-02-08 DIAGNOSIS — Z79.02 ANTIPLATELET OR ANTITHROMBOTIC LONG-TERM USE: ICD-10-CM

## 2024-02-08 PROCEDURE — 99282 EMERGENCY DEPT VISIT SF MDM: CPT

## 2024-02-08 PROCEDURE — 303620 HCHG EPISTAXIS CONTROL

## 2024-02-08 RX ORDER — TRANEXAMIC ACID 100 MG/ML
3 INJECTION, SOLUTION INTRAVENOUS ONCE
Status: DISCONTINUED | OUTPATIENT
Start: 2024-02-08 | End: 2024-02-08 | Stop reason: HOSPADM

## 2024-02-08 NOTE — ED PROVIDER NOTES
ED Provider Note    CHIEF COMPLAINT  Chief Complaint   Patient presents with    Nose Bleed       HPI/ROS  LIMITATION TO HISTORY   Select: : None  OUTSIDE HISTORIAN(S):        Alexandre Hernández is a 54 y.o. male who presents to the emergency department with epistaxis.  Patient developed bleeding from the right nostril this morning.  Is been bleeding for about 4 hours.  Is been persistent.  History of aortic valve replacement.  Maintained on aspirin therapy.  Prior Eliquis use.  No current anticoagulant use besides aspirin.      PAST MEDICAL HISTORY   has a past medical history of Aneurysm of ascending aorta without rupture - 4.6 cm 2023 (1/20/2023), Aortic prosthetic valve regurgitation, Heart murmur, Heart valve disease, History of basal cell cancer, Hypertension (2/13/2023), Infectious disease, Knee meniscus tear (1/21/2023), and PONV (postoperative nausea and vomiting).    SURGICAL HISTORY   has a past surgical history that includes angiogram (02/01/2012); achilles tendon repair (01/01/1990); shoulder arthroscopy w/ rotator cuff repair (11/05/2014); shoulder decompression arthroscopic (11/05/2014); other (03/01/2012); other (01/01/1989); other (01/01/2004); other (Right, 01/01/2016); recovery (04/25/2016); shldr arthroscop,surg,w/rotat cuff repr (Left, 09/20/2021); and other cardiac surgery.    FAMILY HISTORY  Family History   Problem Relation Age of Onset    Hypertension Other     Stroke Other     Hyperlipidemia Mother     Hyperlipidemia Father     Heart Disease Father     Heart Disease Maternal Grandmother        SOCIAL HISTORY  Social History     Tobacco Use    Smoking status: Never    Smokeless tobacco: Never   Vaping Use    Vaping Use: Never used   Substance and Sexual Activity    Alcohol use: Yes     Comment: Occasional    Drug use: No    Sexual activity: Not on file       CURRENT MEDICATIONS  Home Medications    **Home medications have not yet been reviewed for this encounter**          ALLERGIES  Allergies   Allergen Reactions    Contrast Media With Iodine [Iodine] Anaphylaxis    Hydrocodone Rash     Rash      Oxycodone Rash     rash       PHYSICAL EXAM  VITAL SIGNS: There were no vitals taken for this visit.   Nursing note and vitals reviewed.  Constitutional: Well-developed and well-nourished. No distress.   HENT: Head is normocephalic and atraumatic. Oropharynx is clear and moist without exudate or erythema.  Patient has Kleenex packing in the right nostril, there is no active bleeding.  No apparent source of bleeding on the anterior nasal septum when packing is removed.  No active bleeding when packing is removed.  I think the source of bleeding is just behind the anterior nasal septum on the right      COURSE & MEDICAL DECISION MAKING      Epistaxis Procedure Note    Indication: Bleeding    Pre-medication: Premedicated with Afrin at home    Procedure: The patient was positioned appropriately and the nares were cleared as well as possible. The bleeding site was unable to be visualized but was packed with gauze in the right nostril soaked with TXA.  Hemostasis was not able to be obtained and I proceeded with cauterization with silver nitrate.  I was able to achieve hemostasis.    The patient tolerated the procedure well.    Complications: None      ED Observation Status? No; Patient does not meet criteria for ED Observation.     INITIAL ASSESSMENT, COURSE AND PLAN  Care Narrative: The patient presents today with epistaxis.  Currently on aspirin therapy.  Has a history of similar.    The patient will return for new or worsening symptoms and is stable at the time of discharge.    The patient is referred to a primary physician for blood pressure management, diabetic screening, and for all other preventative health concerns.    DISPOSITION:  Patient will be discharged home in stable condition.    FOLLOW UP:  Andre Shen M.D.  [id number]  [place]  [id number]  [phone]    Schedule an appointment as soon as possible for a visit       [facility], Emergency Dept  [address]  [place] [id number] [phone]    If symptoms worsen      OUTPATIENT MEDICATIONS:  New Prescriptions    No medications on file         FINAL DIAGNOSIS  1. Epistaxis    2. Antiplatelet or antithrombotic long-term use           Electronically signed by: Parth Head M.D., 2/8/2024 1:24 PM

## 2024-02-09 ENCOUNTER — TELEPHONE (OUTPATIENT)
Dept: MEDICAL GROUP | Facility: MEDICAL CENTER | Age: 54
End: 2024-02-09
Payer: COMMERCIAL

## 2024-02-09 DIAGNOSIS — E78.5 DYSLIPIDEMIA: Chronic | ICD-10-CM

## 2024-02-09 DIAGNOSIS — E55.9 VITAMIN D DEFICIENCY: ICD-10-CM

## 2024-02-09 DIAGNOSIS — Z00.00 PREVENTATIVE HEALTH CARE: ICD-10-CM

## 2024-02-09 DIAGNOSIS — Z12.5 PROSTATE CANCER SCREENING: ICD-10-CM

## 2024-02-09 PROBLEM — R04.0 EPISTAXIS: Status: ACTIVE | Noted: 2024-02-09

## 2024-02-23 ENCOUNTER — HOSPITAL ENCOUNTER (OUTPATIENT)
Dept: LAB | Facility: MEDICAL CENTER | Age: 54
End: 2024-02-23
Attending: INTERNAL MEDICINE
Payer: COMMERCIAL

## 2024-02-23 DIAGNOSIS — Z12.5 PROSTATE CANCER SCREENING: ICD-10-CM

## 2024-02-23 DIAGNOSIS — E55.9 VITAMIN D DEFICIENCY: ICD-10-CM

## 2024-02-23 DIAGNOSIS — Z00.00 PREVENTATIVE HEALTH CARE: ICD-10-CM

## 2024-02-23 DIAGNOSIS — E78.5 DYSLIPIDEMIA: Chronic | ICD-10-CM

## 2024-02-23 LAB
ALBUMIN SERPL BCP-MCNC: 4.3 G/DL (ref 3.2–4.9)
ALBUMIN/GLOB SERPL: 1.2 G/DL
ALP SERPL-CCNC: 115 U/L (ref 30–99)
ALT SERPL-CCNC: 26 U/L (ref 2–50)
ANION GAP SERPL CALC-SCNC: 12 MMOL/L (ref 7–16)
APPEARANCE UR: CLEAR
AST SERPL-CCNC: 30 U/L (ref 12–45)
BASOPHILS # BLD AUTO: 1.1 % (ref 0–1.8)
BASOPHILS # BLD: 0.04 K/UL (ref 0–0.12)
BILIRUB SERPL-MCNC: 0.5 MG/DL (ref 0.1–1.5)
BILIRUB UR QL STRIP.AUTO: NEGATIVE
BUN SERPL-MCNC: 12 MG/DL (ref 8–22)
CALCIUM ALBUM COR SERPL-MCNC: 9 MG/DL (ref 8.5–10.5)
CALCIUM SERPL-MCNC: 9.2 MG/DL (ref 8.5–10.5)
CHLORIDE SERPL-SCNC: 104 MMOL/L (ref 96–112)
CHOLEST SERPL-MCNC: 179 MG/DL (ref 100–199)
CK SERPL-CCNC: 85 U/L (ref 0–154)
CO2 SERPL-SCNC: 24 MMOL/L (ref 20–33)
COLOR UR: YELLOW
CREAT SERPL-MCNC: 0.97 MG/DL (ref 0.5–1.4)
EOSINOPHIL # BLD AUTO: 0.14 K/UL (ref 0–0.51)
EOSINOPHIL NFR BLD: 3.9 % (ref 0–6.9)
ERYTHROCYTE [DISTWIDTH] IN BLOOD BY AUTOMATED COUNT: 47 FL (ref 35.9–50)
EST. AVERAGE GLUCOSE BLD GHB EST-MCNC: 114 MG/DL
FASTING STATUS PATIENT QL REPORTED: NORMAL
GFR SERPLBLD CREATININE-BSD FMLA CKD-EPI: 93 ML/MIN/1.73 M 2
GLOBULIN SER CALC-MCNC: 3.6 G/DL (ref 1.9–3.5)
GLUCOSE SERPL-MCNC: 97 MG/DL (ref 65–99)
GLUCOSE UR STRIP.AUTO-MCNC: NEGATIVE MG/DL
HBA1C MFR BLD: 5.6 % (ref 4–5.6)
HCT VFR BLD AUTO: 49.1 % (ref 42–52)
HDLC SERPL-MCNC: 64 MG/DL
HGB BLD-MCNC: 16.6 G/DL (ref 14–18)
IMM GRANULOCYTES # BLD AUTO: 0 K/UL (ref 0–0.11)
IMM GRANULOCYTES NFR BLD AUTO: 0 % (ref 0–0.9)
KETONES UR STRIP.AUTO-MCNC: NEGATIVE MG/DL
LDLC SERPL CALC-MCNC: 102 MG/DL
LEUKOCYTE ESTERASE UR QL STRIP.AUTO: NEGATIVE
LYMPHOCYTES # BLD AUTO: 1.06 K/UL (ref 1–4.8)
LYMPHOCYTES NFR BLD: 29.7 % (ref 22–41)
MCH RBC QN AUTO: 31.1 PG (ref 27–33)
MCHC RBC AUTO-ENTMCNC: 33.8 G/DL (ref 32.3–36.5)
MCV RBC AUTO: 92.1 FL (ref 81.4–97.8)
MICRO URNS: NORMAL
MONOCYTES # BLD AUTO: 0.3 K/UL (ref 0–0.85)
MONOCYTES NFR BLD AUTO: 8.4 % (ref 0–13.4)
NEUTROPHILS # BLD AUTO: 2.03 K/UL (ref 1.82–7.42)
NEUTROPHILS NFR BLD: 56.9 % (ref 44–72)
NITRITE UR QL STRIP.AUTO: NEGATIVE
NRBC # BLD AUTO: 0 K/UL
NRBC BLD-RTO: 0 /100 WBC (ref 0–0.2)
PH UR STRIP.AUTO: 5.5 [PH] (ref 5–8)
PLATELET # BLD AUTO: 266 K/UL (ref 164–446)
PMV BLD AUTO: 10.1 FL (ref 9–12.9)
POTASSIUM SERPL-SCNC: 4.3 MMOL/L (ref 3.6–5.5)
PROT SERPL-MCNC: 7.9 G/DL (ref 6–8.2)
PROT UR QL STRIP: NEGATIVE MG/DL
RBC # BLD AUTO: 5.33 M/UL (ref 4.7–6.1)
RBC UR QL AUTO: NEGATIVE
SODIUM SERPL-SCNC: 140 MMOL/L (ref 135–145)
SP GR UR STRIP.AUTO: 1.02
TESTOST SERPL-MCNC: 238 NG/DL (ref 175–781)
TRIGL SERPL-MCNC: 64 MG/DL (ref 0–149)
UROBILINOGEN UR STRIP.AUTO-MCNC: 0.2 MG/DL
WBC # BLD AUTO: 3.6 K/UL (ref 4.8–10.8)

## 2024-02-23 PROCEDURE — 82570 ASSAY OF URINE CREATININE: CPT

## 2024-02-23 PROCEDURE — 36415 COLL VENOUS BLD VENIPUNCTURE: CPT

## 2024-02-23 PROCEDURE — 81003 URINALYSIS AUTO W/O SCOPE: CPT

## 2024-02-23 PROCEDURE — 82306 VITAMIN D 25 HYDROXY: CPT

## 2024-02-23 PROCEDURE — 80061 LIPID PANEL: CPT

## 2024-02-23 PROCEDURE — 84443 ASSAY THYROID STIM HORMONE: CPT

## 2024-02-23 PROCEDURE — 84153 ASSAY OF PSA TOTAL: CPT

## 2024-02-23 PROCEDURE — 82550 ASSAY OF CK (CPK): CPT

## 2024-02-23 PROCEDURE — 83036 HEMOGLOBIN GLYCOSYLATED A1C: CPT

## 2024-02-23 PROCEDURE — 82043 UR ALBUMIN QUANTITATIVE: CPT

## 2024-02-23 PROCEDURE — 84403 ASSAY OF TOTAL TESTOSTERONE: CPT

## 2024-02-23 PROCEDURE — 80053 COMPREHEN METABOLIC PANEL: CPT

## 2024-02-23 PROCEDURE — 85025 COMPLETE CBC W/AUTO DIFF WBC: CPT

## 2024-02-24 LAB
25(OH)D3 SERPL-MCNC: 52 NG/ML (ref 30–100)
CREAT UR-MCNC: 167.13 MG/DL
MICROALBUMIN UR-MCNC: <1.2 MG/DL
MICROALBUMIN/CREAT UR: NORMAL MG/G (ref 0–30)
PSA SERPL-MCNC: 0.94 NG/ML (ref 0–4)
TSH SERPL DL<=0.005 MIU/L-ACNC: 1.72 UIU/ML (ref 0.38–5.33)

## 2024-04-21 ENCOUNTER — TELEPHONE (OUTPATIENT)
Dept: MEDICAL GROUP | Facility: MEDICAL CENTER | Age: 54
End: 2024-04-21
Payer: COMMERCIAL

## 2024-04-21 DIAGNOSIS — E78.5 DYSLIPIDEMIA: Chronic | ICD-10-CM

## 2024-04-21 DIAGNOSIS — Z00.00 PREVENTATIVE HEALTH CARE: Chronic | ICD-10-CM

## 2024-05-02 ENCOUNTER — APPOINTMENT (OUTPATIENT)
Dept: CARDIOLOGY | Facility: MEDICAL CENTER | Age: 54
End: 2024-05-02
Attending: SURGERY
Payer: COMMERCIAL

## 2024-05-02 DIAGNOSIS — Z86.79 PERSONAL HISTORY OF CARDIOVASCULAR DISORDER: ICD-10-CM

## 2024-05-02 DIAGNOSIS — Z98.890 PERSONAL HISTORY OF SURGERY TO HEART AND GREAT VESSELS, PRESENTING HAZARDS TO HEALTH: ICD-10-CM

## 2024-05-02 LAB — LV EJECT FRACT  99904: 65

## 2024-05-02 PROCEDURE — 93306 TTE W/DOPPLER COMPLETE: CPT | Mod: 26 | Performed by: INTERNAL MEDICINE

## 2024-05-28 ENCOUNTER — HOSPITAL ENCOUNTER (OUTPATIENT)
Dept: LAB | Facility: MEDICAL CENTER | Age: 54
End: 2024-05-28
Attending: INTERNAL MEDICINE
Payer: COMMERCIAL

## 2024-05-28 DIAGNOSIS — E78.5 DYSLIPIDEMIA: Chronic | ICD-10-CM

## 2024-05-28 DIAGNOSIS — Z00.00 PREVENTATIVE HEALTH CARE: Chronic | ICD-10-CM

## 2024-05-28 LAB
ALBUMIN SERPL BCP-MCNC: 4.7 G/DL (ref 3.2–4.9)
ALBUMIN/GLOB SERPL: 1.5 G/DL
ALP SERPL-CCNC: 126 U/L (ref 30–99)
ALT SERPL-CCNC: 34 U/L (ref 2–50)
ANION GAP SERPL CALC-SCNC: 15 MMOL/L (ref 7–16)
AST SERPL-CCNC: 37 U/L (ref 12–45)
BILIRUB SERPL-MCNC: 0.7 MG/DL (ref 0.1–1.5)
BUN SERPL-MCNC: 14 MG/DL (ref 8–22)
CALCIUM ALBUM COR SERPL-MCNC: 8.9 MG/DL (ref 8.5–10.5)
CALCIUM SERPL-MCNC: 9.5 MG/DL (ref 8.5–10.5)
CHLORIDE SERPL-SCNC: 100 MMOL/L (ref 96–112)
CHOLEST SERPL-MCNC: 195 MG/DL (ref 100–199)
CK SERPL-CCNC: 170 U/L (ref 0–154)
CO2 SERPL-SCNC: 22 MMOL/L (ref 20–33)
CREAT SERPL-MCNC: 1.06 MG/DL (ref 0.5–1.4)
FASTING STATUS PATIENT QL REPORTED: NORMAL
GFR SERPLBLD CREATININE-BSD FMLA CKD-EPI: 83 ML/MIN/1.73 M 2
GLOBULIN SER CALC-MCNC: 3.2 G/DL (ref 1.9–3.5)
GLUCOSE SERPL-MCNC: 92 MG/DL (ref 65–99)
HDLC SERPL-MCNC: 71 MG/DL
LDLC SERPL CALC-MCNC: 107 MG/DL
POTASSIUM SERPL-SCNC: 4.5 MMOL/L (ref 3.6–5.5)
PROT SERPL-MCNC: 7.9 G/DL (ref 6–8.2)
SODIUM SERPL-SCNC: 137 MMOL/L (ref 135–145)
TESTOST SERPL-MCNC: 331 NG/DL (ref 175–781)
TRIGL SERPL-MCNC: 83 MG/DL (ref 0–149)

## 2024-05-30 LAB
APO B100 SERPL-MCNC: 90 MG/DL (ref 66–133)
TESTOST FREE SERPL-MCNC: 63 PG/ML (ref 47–244)

## 2024-06-02 ENCOUNTER — TELEPHONE (OUTPATIENT)
Dept: MEDICAL GROUP | Facility: MEDICAL CENTER | Age: 54
End: 2024-06-02
Payer: COMMERCIAL

## 2024-06-02 DIAGNOSIS — I83.90 VARICOSE VEINS OF LOWER EXTREMITY, UNSPECIFIED LATERALITY, UNSPECIFIED WHETHER COMPLICATED: ICD-10-CM

## 2024-06-14 ENCOUNTER — TELEPHONE (OUTPATIENT)
Dept: MEDICAL GROUP | Facility: MEDICAL CENTER | Age: 54
End: 2024-06-14
Payer: COMMERCIAL

## 2024-06-14 DIAGNOSIS — I83.90 VARICOSE VEINS OF LOWER EXTREMITY, UNSPECIFIED LATERALITY, UNSPECIFIED WHETHER COMPLICATED: ICD-10-CM

## 2024-06-16 DIAGNOSIS — Z95.2 S/P AORTIC VALVE REPLACEMENT: Chronic | ICD-10-CM

## 2024-06-16 DIAGNOSIS — Z00.00 PREVENTATIVE HEALTH CARE: Chronic | ICD-10-CM

## 2024-06-16 DIAGNOSIS — I71.21 ANEURYSM OF ASCENDING AORTA WITHOUT RUPTURE (HCC): Chronic | ICD-10-CM

## 2024-06-16 DIAGNOSIS — G47.30 SLEEP DISORDER BREATHING: ICD-10-CM

## 2024-06-16 PROBLEM — Z86.79 S/P AAA REPAIR: Status: ACTIVE | Noted: 2023-01-20

## 2024-06-16 PROBLEM — Z87.898 HISTORY OF EPISTAXIS: Status: ACTIVE | Noted: 2024-02-09

## 2024-06-16 PROBLEM — Z98.890 S/P AAA REPAIR: Status: ACTIVE | Noted: 2023-01-20

## 2024-06-25 ENCOUNTER — OFFICE VISIT (OUTPATIENT)
Dept: MEDICAL GROUP | Facility: MEDICAL CENTER | Age: 54
End: 2024-06-25
Payer: COMMERCIAL

## 2024-06-25 VITALS
SYSTOLIC BLOOD PRESSURE: 136 MMHG | HEIGHT: 71 IN | RESPIRATION RATE: 14 BRPM | OXYGEN SATURATION: 98 % | WEIGHT: 168 LBS | TEMPERATURE: 98.7 F | HEART RATE: 77 BPM | DIASTOLIC BLOOD PRESSURE: 82 MMHG | BODY MASS INDEX: 23.52 KG/M2

## 2024-06-25 DIAGNOSIS — Z98.890 S/P AAA REPAIR: ICD-10-CM

## 2024-06-25 DIAGNOSIS — Z00.00 PREVENTATIVE HEALTH CARE: Chronic | ICD-10-CM

## 2024-06-25 DIAGNOSIS — E78.5 DYSLIPIDEMIA: Chronic | ICD-10-CM

## 2024-06-25 DIAGNOSIS — Z86.79 S/P AAA REPAIR: ICD-10-CM

## 2024-06-25 DIAGNOSIS — R74.8 ABNORMAL ALKALINE PHOSPHATASE TEST: ICD-10-CM

## 2024-06-25 DIAGNOSIS — Z12.11 COLON CANCER SCREENING: ICD-10-CM

## 2024-06-25 DIAGNOSIS — Z95.2 S/P AORTIC VALVE REPLACEMENT: Chronic | ICD-10-CM

## 2024-06-25 DIAGNOSIS — T50.8X5S ADVERSE EFFECT OF CONTRAST MEDIA, SEQUELA: ICD-10-CM

## 2024-06-25 DIAGNOSIS — R73.09 IMPAIRED GLUCOSE METABOLISM: ICD-10-CM

## 2024-06-25 RX ORDER — ROSUVASTATIN CALCIUM 10 MG/1
10 TABLET, COATED ORAL EVERY EVENING
COMMUNITY

## 2024-06-25 ASSESSMENT — FIBROSIS 4 INDEX: FIB4 SCORE: 1.29

## 2024-06-25 NOTE — PROGRESS NOTES
Subjective     Alexandre Hernández is a 54 y.o. male who presents with follow up review labs          HPI    Patient here for follow-up, I last saw him over a year ago, in the interim he has had revision of his aortic valve replacement density done in 2012, revision done at the [facility] by  cardiothoracic surgery, on October 18, redo aortic root replacement with coronary button reconstruction, reimplantation, repair ascending aortic aneurysm, postoperatively patient has been doing well, has been cleared for activity no restrictions.  Most recent cardiology note 5/21/24  [facility] cardiothoracic surgery note telemedicine appointment doing well postoperatively recent transthoracic echo mean gradient 5.1, slightly elevated baseline heart rate cannot tolerate metoprolol or carvedilol in the past, could consider calcium channel blocker, follow-up 1 year echo review.  Patient has had an elevated heart rate above his baseline since the surgery.  However he continues to be quite active doing weights and cardiovascular exercise three times per week, does treadmill 30 minutes 3 miles at a time with interval training going up to 6 to 7 mph without any issues.  Continues to follow a healthy nutritional program.  Remains on losartan 25 mg daily, rosuvastatin 10 mg daily, aspirin 81 mg.  Venous varicosities right lower extremity, he did have the saphenous vein graft from his left leg, but his right venous varicosities bother him the most around his heel region and foot region, they can be uncomfortable at times and may tend to bleed on occasion.  Referral has been made to [facility] for [facility] however they did not have the instrumentation yet for varicose vein treatments and an additional referral has been made to the [facility] [place].  Medications, allergies, medical history, surgical history, social history, family history  reviewed and updated            Current  Outpatient Medications   Medication Sig Dispense Refill    Cholecalciferol (VITAMIN D3 PO) Take 5,000 Units by mouth every day.      aspirin 81 MG EC tablet Take 81 mg by mouth every day.      losartan (COZAAR) 25 MG Tab Take 1 Tablet by mouth every day. 100 Tablet 3    Fexofenadine HCl (ALLEGRA PO) Take 1 Tablet by mouth every day.       No current facility-administered medications for this visit.              Allergic reaction to contrast media during cardiac catheterization severe allergic reaction although he has had iodinated contrast most recently in September at [facility] with premedication, no reaction     Dyslipidemia  2/21/12  [facility] [place] cardiology procedure note cardiac catheterization left main widely patent, LAD large caliber vessel, to diagnose free of disease, circumflex renal disease, 2 marginal branches widely patent, right coronary modest sized nondominant vessel, normal epicardial coronary arteries  9/24/14 chol 235,trig 133,hdl 52,ldl 153,total ldl real 127, apoB 114,Lp(a) 5,pattern B ldl   4/27/16 chol 191,trig 50,hdl 51,ldl 130,LDL-P 1556,HDL-P 26,LP-IR 26; 10 year cardiac risk 2%  2/8/17 chol 244,trig 66,hdl 65,ldl 166  10/2/19 chol 198,trig 152,hdl 48,ldl 120   11/25/19 10-year cardiac risk 3%  9/13/21 chol 246,trig 164,hdl 49,ldl 164; 10 year risk 5.0%  11/6/21 CT cardiac calcium score: LMA 0.0,LCx 0.0, .9, RCA 0.0, PDA 0.0 = 105.9  1/28/23 chol 235,trig 76,hdl 61,ldl 159  1/28/23 will start crestor 10 mg after returning from trip per cardiology  9/20/23 CT coronary arteries minimal nonobstructive coronary atherosclerosis 1 to 24%, left dominant system, coronary artery calcium score; left main-0, LAD-97, left circumflex-0, RCA-0  2/24/24 chol 179,trig 64,hdl 64,ldl 102,cpk 85 on crestor 10 mg  6/1/24 chol 195,trig 83,hdl 71,ldl 107,apolipoprotein B 90,cpk 170 on crestor 10 mg     History of basal cell skin cancer followed by skin cancer dermatology [place]  4/12/19   skin cancer dermatology note  10/30/19  skin cancer dermatology note  12/20/21  skin cancer dermatology note    history epistaxis  2/8/24 ED note epistaxis right nostril bleeding for about 4 hours, cauterization with silver nitrate, adequate hemostasis achieved     History of foot right cellullitis  MSSA infection  3/24/16 xray right foot done in [place] soft tissue swelling no fractures  3/24/16 MRI right foot with and without done in [place], no acute fracture, subluxation, dislocation, focal cortical irregularity base proximal phalanx first digit, no pathologic enhancement, peripheral collection first MTP 1.2 x 2.8 x 2.9 cm, 2.9 cm abscess occipital phalanx right first digit, osteomyelitis cannot be fully excluded  5/17/16 wbc 3.5 (56%N,31%L),ESR 4,CRP 0.4     History of knee meniscus tear  2/2/17 MRI left knee at [facility] extensive bone contusion anterior aspect medial and lateral tibial plateau with small microtrabecular fracture lateral tibial plateau, subtle tear posterior horn medial meniscus    history tibia left fracture  2/16/23  orthopedic note skiing injury 8 days ago left knee MRI done today demonstrates left tibial fracture, x-rays performed possible subtle fracture oblique imaging lateral tibial plateau, recommend crutches x6 weeks hinged brace, follow-up 2 weeks for repeat x-ray  2/16/23 MRI left knee, impaction fracture lateral tibial plateau anteriorly which is mildly comminuted, articular surface impaction up to 3 mm anteriorly, extends through anterior lateral proximal tibia, chronic horizontal tear posterior horn medial meniscus unchanged from previous, chronic mild sprain ACL, sprain collateral ligament grade 1 injury, strain popliteus muscle and tendon, marrow edema nondisplaced trabecular fracture proximal fibula, strain tibialis anterior  3/2/23 x-ray left knee weightbearing left knee x-ray no change in alignment lateral tibial plateau fracture, slight  displacement remains  3/2/23 [facility] note x-ray reviewed, recommend continue crutches, can advance to partial weightbearing, repeat x-rays after returning from [place] trip in march4/6/23  orthopedic note left tibial plateau fracture nondisplaced, recommend stay away from high-impact activities, gradually start in 4 to 6 months from his injury, follow-up as needed x-ray left knee no evidence of displacement, fracture is hard to visualize     hypertension  2/13/23  telephone note start losartan 25 mg  2/21/23 ultrasound aorta iliac duplex, distal aorta slightly ectatic, abdominal aorta and bilateral iliac arteries normal, no stenosis of proximal major visceral arteries  9/20/23 CT-CTA chest with and without postprocessing at [place] acing aorta 4.4 cm diameter, mid descending thoracic aorta 2.1 cm diameter, minimal atheroma throughout thoracic aorta  9/20/23 CTA abdomen pelvis with without postprocessing, abdominal aorta no aneurysm or dissection, mild plaque, iliac and femoral arteries no aneurysm or dissection, mild plaque, aberrant right renal artery origin from anterior portion of abdominal aorta  9/20/23 CT coronary arteries minimal nonobstructive coronary atherosclerosis 1 to 24%, left dominant system, coronary artery calcium score; left main-0, LAD-97, left circumflex-0, RCA-0    Neutropenia  4/16/12 wbc 4.6  9/24/14 wbc 3.3 (48%N,38%L)  5/16/16 wbc 3.5 (56%N,31%L)  2/8/17 wbc 4.7  10/2/19 wbc 3.9 (55%N,25%L)  9/13/21 wbc 3.1 (55.8%N,28%L)  1/28/23 wbc 4.0 b12 599  2/24/24 wbc 3.6 (56%N,29%L)     preventative  10/24/14 colon per [facility] small internal hemorrhoids, repeat 10 years  5/16/16 prevnar  7/21/16 pneumovax  11/25/19 tdap   11/20/20 shingrix first  9/22/21 cologuard negative  1/28/23 hep c ab negative  1/28/23 hep b ab 752 immune  9/28/23 flu  2/24/24 psa 0.94  2/24/24 A1c 5.6%  2/24/24 vit d 52  6/1/24 total testosterone 331,free testosterone 63    sleep disordered breathing  9/9/21 has had sleep  study previously by an outside provider many years ago we do not have those records, was told CPAP is not necessary, he is using a mouthguard which has been beneficial for snoring     Status post Achilles left tendon repair 1989     Status post aortic valve replacement  6/12/10 echo EF 60%, aortic stenosis peak gradient 74, aortic valve area 0.75  2/1/12  cardiology note echo calculated valve area 0.5, peak gradient 119, progressive aortic stenosis proceed with angiogram  2/20/12  cardiology note progressive aortic stenosis, more shortness of breath with exercise echo last year aortic valve area 1 cm recommend coronary angiography and referral for valve replacement  2/21/12 cardiac catheterization  cardiology substantial allergic reaction to intravenous contrast during anaphylaxis, severe aortic stenosis valve gradient 0.6, left dominant coronary tree, left main widely patent, LAD large caliber vessel with 2 diagonal branches free of significant disease, circumflex large dominant vessel free of disease, marginal branches and posterior descending widely patent, RCA moderate-sized nondominant vessel  3/12/12 intraoperative echocardiogram [facility] small PFO intra-atrial septum, mild LVH, EF 55%, aortic valve bicuspid and severely calcified, severe aortic stenosis  3/12/12  cardiac surgery in [facility] operative note aortic valve replacement, porcine heterograft  9/22/14  cardiology [facility] note repeat labs and echo  9/23/14 echo LVH, mild concentric LVH, EF 60-65%, trace MR, normal functioning bioprosthetic bovine aortic valve, trace aortic insufficiency, trace TR, RVSP 20-25  4/25/16 echo normal LV function, EF 60%  8/18/16 transesophageal echocardiogram bioprosthetic aortic valve, trace aortic insufficiency, no vegetation noted  10/16/19 echo EF 65%, normal bioprosthetic aortic valve functioning normally with normal transvalvular  gradients  8/26/21 echo EF 60%, known bioprosthetic aortic valve functioning normally with normal transvalvular gradient, peak gradient 30  1/10/23 echo EF 60%, normal bioprosthetic heart valve with elevated transvalvular gradient, moderate bioprosthetic stenosis peak gradient 54, moderate to severe intralvalular leak  1/20/23 transesophageal echo, normal bioprosthetic aortic valve, severe prosthetic intra valvular aortic insufficiency, ascending aorta 4.6 x 4.2 cm, moderate prosthetic aortic stenosis, normal LV function EF 60%  1/28/23 retic 1.1%,  2/14/23 CT thorax without, ascending aorta aneurysmal, 4.8 cm maximal dilation (see addendum), moderate calcified left coronary plaque  2/28/23  cardiovascular surgery [facility] in [place] moderate to severe prosthetic aortic valve regurgitation, moderate stenosis, and 4.7 cm ascending aortic aneurysm in the setting of previous AVR with 27 mm porcine bioprosthesis, could consider redo surgical intervention class II indication ideally requiring aortic root and ascending hemiarch replacement but he would prefer not to be on chronic anticoagulation, he would be interested in redo bioprosthesis or potentially a ross procedure therefore recommend continued surveillance with echo, CT, delayed redo surgical intervention, also consider possibility of transcatheter valve in valve replacement as a bridge and delivery to open surgical intervention until a sending order which is 5 cm  7/6/23 CT thorax without, enlargement ascending aortic ectasia 48 mm  7/6/23 echo normal systolic function EF 60%, moderate left ventricular hypertrophy, normal diastolic function, known bioprosthetic valve with peak gradient 55, mean gradient 33, severe valvular aortic insufficiency, aortic root 4.6 cm  9/20/23 CT-CTA chest with and without postprocessing at [facility] aorta 4.4 cm diameter, mid descending thoracic aorta 2.1 cm diameter, minimal atheroma throughout thoracic  aorta  9/20/23 CTA abdomen pelvis with without postprocessing, abdominal aorta no aneurysm or dissection, mild plaque, iliac and femoral arteries no aneurysm or dissection, mild plaque, aberrant right renal artery origin from anterior portion of abdominal aorta  9/20/23 CT coronary arteries minimal nonobstructive coronary atherosclerosis 1 to 24%, left dominant system, coronary artery calcium score; left main-0, LAD-97, left circumflex-0, RCA-0  10/18/23  [facility] thoracic surgery redo aortic root replacement with coronary button reconstruction and reimplantation, repair ascending aortic aneurysm  10/18/23 intraoperative transesophageal echocardiogram during redo aortic valve replacement, normal global biventricular systolic function EF 55 to 70%, stented bioprosthetic valve in aortic position with severe regurgitation moderate stenosis, post bypass images after placement of 27 mm santiago konect resilia valved graft showed preserved global biventricular systolic function, aortic leaflets open normally with no valvular regurgitation, mean valve gradient 4 mmHg  10/24/23 echo mild left ventricular hypertrophy, ejection fraction 55%, normal diastolic filling, normal right ventricular size, 27 mm aortic bioprosthesis, normal opening, well-seated, trivial paravalvular leak, aortic root normal size  10/25/23 wbc 6.4,hgb 9.3,hct 28,plt 322  10/25/23 bun 8,creat 1.0  11/20/23 CT thorax without, postoperative repair ascending aorta, ascending artery measures 3.3 x 3.1 cm  12/5/23  [facility] cardiothoracic surgery telemedicine note, repeat echo in april, can discontinue eliquis and of this month, if continues to have elevated heart rate consider starting propranolol  5/2/24 echo mild LVH, normal LV size and systolic function EF 65 to 70%, normal diastolic function, normal right ventricular size and function, known bioprosthetic aortic valve functioning normally with normal transvalvular  gradient, RVSP 20, ascending aorta with graft measuring 3.2 cm    s/p ascending aortic aneurysm repair  1/20/23 transesophageal echo, normal bioprosthetic aortic valve, severe prosthetic intra valvular aortic insufficiency, ascending aorta 4.6 x 4.2 cm, moderate prosthetic aortic stenosis, normal LV function EF 60%  2/14/23 CT thorax without, 4.8 cm ascending aortic aneurysm, no suspicious lung nodules, no significant adenopathy, moderate calcified left coronary plaque  2/21/23 ultrasound aorta iliac duplex, distal aorta slightly ectatic, abdominal aorta and bilateral iliac arteries normal, no stenosis of proximal major visceral arteries  2/28/23  cardiovascular surgery [facility], aneurysmal dilatation appears to be confined to the ascending aorta, not involving the root, considering his young age, lack of comorbidities, 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 and moderate ascending aortic dilatation, it would not be unreasonable to proceed with redo surgical intervention, ideally he will require aortic root and ascending hemiarch replacement using mechanical valve conduit but the patient prefers not to be on anticoagulation with coumadin, he is more interested in redo bioprosthesis or potential ross procedure, recommend continued surveillance with echo and CT, delayed redo surgical intervention, discussed potential of transcatheter valve in valve replacement as a bridge and delay redo open surgical intervention until ascending aorta reaches 5 cm  9/20/23 CT-CTA chest with and without postprocessing at [place] acing aorta 4.4 cm diameter, mid descending thoracic aorta 2.1 cm diameter, minimal atheroma throughout thoracic aorta  9/20/23 CTA abdomen pelvis with without postprocessing, abdominal aorta no aneurysm or dissection, mild plaque, iliac and femoral arteries no aneurysm or dissection, mild plaque, aberrant right renal artery origin from anterior portion of abdominal  aorta  10/18/23  [facility] thoracic surgery redo aortic root replacement with coronary button reconstruction and reimplantation, repair ascending aortic aneurysm  10/18/23 intraoperative transesophageal echocardiogram during redo aortic valve replacement, normal global biventricular systolic function EF 55 to 70%, stented bioprosthetic valve in aortic position with severe regurgitation moderate stenosis, post bypass images after placement of 27 mm santiago konect resilia valved graft showed preserved global biventricular systolic function, aortic leaflets open normally with no valvular regurgitation, mean valve gradient 4 mmHg  10/24/23 echo mild left ventricular hypertrophy, ejection fraction 55%, normal diastolic filling, normal right ventricular size, 27 mm aortic bioprosthesis, normal opening, well-seated, trivial paravalvular leak, aortic root normal size  11/20/23 CT thorax without, postoperative repair ascending aorta, ascending artery measures 3.3 x 3.1 cm  12/5/23  [facility] cardiothoracic surgery telemedicine note, repeat echo in april, can discontinue eliquis and of this month, if continues to have elevated heart rate consider starting propranolol  5/2/24 echo mild LVH, normal LV size and systolic function EF 65 to 70%, normal diastolic function, normal right ventricular size and function, known bioprosthetic aortic valve functioning normally with normal transvalvular gradient, RVSP 20, ascending aorta with graft measuring 3.2 cm     Status post T12 osteoma surgery 1990     Status post rotator cuff right surgery  8/21/14 MRI right shoulder without; full-thickness distal/anterior supraspinatus tendon tear retraction 7 mm  11/5/14  right shoulder arthroscopy and rotator cuff repair, subacromial decompression     Status post Shoulder left arthroscopy  7/31/21 MRI left shoulder without, full-thickness tear anterior edge supraspinatus, focal calcification insertion  infraspinatus with surrounding soft tissue edema suggestive of calcific tendinitis, tendinopathy of subscapularis and proximal long head biceps tendon, mild to moderate degenerative changes acromioclavicular joint  8/17/21  orthopedic note left shoulder pain x5 years, MRI reveals full-thickness rotator cuff tear, plan left shoulder arthroscopic rotator cuff repair and subacromial decompression  9/20/21  orthopedic operative note left shoulder arthroscopy, rotator cuff repair, subacromial decompression, debridement of glenohumeral joint  9/20/21 x-ray left shoulder reveals well done subacromial decompression with mild calcifications posterior to the humeral head       Patient Active Problem List   Diagnosis    S/P aortic valve replacement    S/P rotator cuff right surgery    History of foot abscess, right    S/P Achilles tendon repair    S/p thumb left surgery    S/p osteoma T12 surgery    History of basal cell cancer    Adverse reaction to contrast media    Dyslipidemia    Neutropenia (CMS-HCC)    Preventative health care    History of knee meniscus tear    S/p shoulder left arthroscopy      Sleep disorder breathing    Aortic prosthetic valve regurgitation    s/p ascending aortic aneurysm repair    Hypertension    History of tibia left fracture    History of epistaxis          Health Maintenance Summary            Overdue - Zoster (Shingles) Vaccines (2 of 2) Overdue since 1/15/2021      11/20/2020  Imm Admin: Zoster Vaccine Recombinant (RZV) (SHINGRIX)              Overdue - COVID-19 Vaccine (5 - 2023-24 season) Overdue since 9/1/2023      10/27/2022  Imm Admin: PFIZER BIVALENT SARS-COV-2 VACCINE (12+)    08/25/2021  Imm Admin: PFIZER PURPLE CAP SARS-COV-2 VACCINATION (12+)    01/10/2021  Imm Admin: PFIZER PURPLE CAP SARS-COV-2 VACCINATION (12+)    12/20/2020  Imm Admin: PFIZER PURPLE CAP SARS-COV-2 VACCINATION (12+)              Influenza Vaccine (Season Ended) Next due on 9/1/2024      10/25/2022   Imm Admin: Influenza Vaccine Quad Inj (Pf)    10/27/2020  Imm Admin: Influenza Vaccine Quad Inj (Pf)    09/10/2019  Imm Admin: Influenza Vaccine Quad Inj (Pf)    10/17/2017  Imm Admin: Influenza Vaccine Quad Inj (Pf)    10/20/2016  Imm Admin: Influenza Vaccine Quad Inj (Pf)    Only the first 5 history entries have been loaded, but more history exists.              Colorectal Cancer Screening (Colon Cancer Screening Cologuard Stool (FIT DNA) - Every 3 Years) Tentatively due on 9/22/2024 09/22/2021  COLOGUARD COLON CANCER SCREENING    09/22/2021  COLOGUARD COLON CANCER SCREENING    09/17/2021  OCCULT BLOOD FECES IMMUNOASSAY              IMM DTaP/Tdap/Td Vaccine (2 - Td or Tdap) Next due on 11/25/2029 11/25/2019  Imm Admin: Tdap Vaccine              Pneumococcal Vaccine: 0-64 Years (Series Information) Aged Out      07/21/2016  Imm Admin: Pneumococcal polysaccharide vaccine (PPSV-23)    05/16/2016  Imm Admin: Pneumococcal Conjugate Vaccine (Prevnar/PCV-13)              Hepatitis C Screening  Completed      02/09/2024  Done    01/27/2023  Hepatitis C Antibody component of HEP C VIRUS ANTIBODY              Hepatitis A Vaccine (Hep A) (Series Information) Aged Out      No completion history exists for this topic.              HPV Vaccines (Series Information) Aged Out      No completion history exists for this topic.              Polio Vaccine (Inactivated Polio) (Series Information) Aged Out      No completion history exists for this topic.              Meningococcal Immunization (Series Information) Aged Out      No completion history exists for this topic.              Discontinued - HIV Screening  Discontinued      No completion history exists for this topic.              Discontinued - Hepatitis B Vaccine (Hep B)  Discontinued      No completion history exists for this topic.                    Patient Care Team:  Andre Shen M.D. as PCP - General (Internal Medicine)      ROS           Objective           Physical Exam  Vitals and nursing note reviewed.   Constitutional:       Appearance: Normal appearance.   HENT:      Head: Normocephalic and atraumatic.      Right Ear: External ear normal.      Left Ear: External ear normal.   Eyes:      Conjunctiva/sclera: Conjunctivae normal.   Cardiovascular:      Rate and Rhythm: Normal rate and regular rhythm.      Heart sounds: Normal heart sounds.   Pulmonary:      Effort: Pulmonary effort is normal.      Breath sounds: Normal breath sounds.   Abdominal:      General: There is no distension.   Musculoskeletal:         General: No swelling.      Cervical back: Neck supple.   Skin:     Coloration: Skin is not jaundiced.      Findings: No bruising.   Neurological:      General: No focal deficit present.      Mental Status: He is alert.   Psychiatric:         Mood and Affect: Mood normal.         Behavior: Behavior normal.          Venous varicosities right lower extremity, appears more prominent in the distal  medial aspect of the small saphenous vein, no current bleeding        Records reviewed from [place]  9/20/23 CT-CTA chest with and without postprocessing at [place] acing aorta 4.4 cm diameter, mid descending thoracic aorta 2.1 cm diameter, minimal atheroma throughout thoracic aorta  9/20/23 CTA abdomen pelvis with without postprocessing, abdominal aorta no aneurysm or dissection, mild plaque, iliac and femoral arteries no aneurysm or dissection, mild plaque, aberrant right renal artery origin from anterior portion of abdominal aorta  9/20/23 CT coronary arteries minimal nonobstructive coronary atherosclerosis 1 to 24%, left dominant system, coronary artery calcium score; left main-0, LAD-97, left circumflex-0, RCA-0         Assessment & Plan        Assessment  #1  Status post aortic root replacement initially 2012 with redo, repair of ascending aortic aneurysm October of last year, doing well, has resumed full activity and exercise without issues most recent echo  in May normal EF 65 to 70%, bioprosthetic aortic valve functioning normally with normal transvalvular gradient, ascending aortic graft 3.2 cm, recently seen by cardiothoracic surgery, recommend follow-up in a year    #2 hypertension stable on losartan, could not tolerate metoprolol and carvedilol as prescribed by [facility]    #3 dyslipidemia most recent lab 6/1/24 chol 195,trig 83,hdl 71,ldl 107,apolipoprotein B 90,cpk 170 on crestor 10 mg, most recent imaging for coronary calcium done at [place] 9/20/23 CT coronary arteries minimal nonobstructive coronary atherosclerosis 1 to 24%, left dominant system, coronary artery calcium score; left main-0, LAD-97, left circumflex-0, RCA-0 = 97 CPK slightly elevated on most recent labs but no side effects with the rosuvastatin, 10 year HUTCHINSON risk using CAC is 7.7% on crestor 10 mg      #4 symptomatic venous varicosities right lower extremity, will have some pain, discomfort significant bleeding at times from the lower extremity venous varicosities    #5 skin lesions followed by dermatology annually we do not have any recent records    #6 preventative health will be due for upcoming colonoscopy in October per GI consultants, last in October 2014    #7 mildly elevated alkaline phosphatase with normal liver enzymes AST, ALT, bilirubin    #8 impaired glucose metabolism A1c 5.6%, following a good nutrition program and exercising regularly    Plan  #1 reviewed old records from [facility] and updated the problem list    #2 referral previously placed to  vascular surgery and to [facility] vein [place], I believe the patient would benefit from treatment for his venous varicosities as his right lower extremity venous varicosities does cause pain and has some bleeding associated with the venous varicosities    #3 continue check blood pressure regularly, if heart rate elevates consistently could consider trial of low-dose calcium channel blocker per [facility]  note    #4 old records dermatology    #5 referral to GI consultants for colonoscopy that will be due in October    #6 recheck lipid panel, A1c, alkaline phosphatase end of the year, orders for the fasting blood test placed the computer system    #7 continue with his good nutrition and exercise program    #8 discussed potentially increasing Crestor to 20 mg although he is rightfully concerned about the potential for development of diabetes on statin therapy given his recent A1c of 5.6% however the data has been conflicting about the relationship or association between statin therapy and diabetes, perhaps high-dose statin therapy such as atorvastatin 40 mg daily might increase risk of diabetes perhaps 1 additional case of diabetes for every 200-500 patients on statin therapy, another consideration would be ezetimibe but given his CT calcium score has been stable based upon his CT calcium score in 2021 and a CT coronary artery scan at [place] last year I believe it is reasonable to continue on the rosuvastatin at his current dose, we will repeat an A1c later this year and he will continue to work on and optimize his nutrition and exercise program.    #9 follow-up 1 year

## 2024-06-26 ENCOUNTER — TELEPHONE (OUTPATIENT)
Dept: MEDICAL GROUP | Facility: MEDICAL CENTER | Age: 54
End: 2024-06-26

## 2024-07-19 PROBLEM — I83.90 VARICOSE VEIN OF LEG: Status: ACTIVE | Noted: 2024-07-19

## 2024-08-20 ENCOUNTER — TELEPHONE (OUTPATIENT)
Dept: CARDIOLOGY | Facility: MEDICAL CENTER | Age: 54
End: 2024-08-20
Payer: COMMERCIAL

## 2024-08-20 RX ORDER — AMOXICILLIN 500 MG/1
CAPSULE ORAL
Qty: 20 CAPSULE | Refills: 3 | Status: SHIPPED | OUTPATIENT
Start: 2024-08-20

## 2024-08-20 NOTE — TELEPHONE ENCOUNTER
Patient called given prior concern for endocarditis it is reasonable to take abx prior to colonoscopy, I sent a Rx and clearance

## 2024-08-20 NOTE — LETTER
PROCEDURE/SURGERY CLEARANCE FORM      Encounter Date: 8/20/2024    Patient: Alexandre Hernández  YOB: 1970    CARDIOLOGIST:  Lucius Jean M.D.    REFERRING DOCTOR: Dr Mcclelland    The above patient is cleared to have the following procedure/surgery:  GI procedures                                           Additional comments: He can proceed with the proposed procedure or surgery from a cardiac standpoint, no modifiable cardiovascular risk, no further cardiac testing required, hold antiplatelet as necessary, resume as soon as possible typically when patient is able to take oral medications.    It is my pleasure to participate in the care of Mr. Hernández.  Please do not hesitate to contact me with questions or concerns. [facility] Cardiology is available 24/7 for consultative services at [phone] in the perioperative period.    Electronically Signed    Lucius Jean MD PhD FAC  Cardiologist [facility]

## 2024-10-03 ENCOUNTER — PHARMACY VISIT (OUTPATIENT)
Dept: PHARMACY | Facility: MEDICAL CENTER | Age: 54
End: 2024-10-03
Payer: COMMERCIAL

## 2024-10-03 PROCEDURE — RXMED WILLOW AMBULATORY MEDICATION CHARGE: Performed by: INTERNAL MEDICINE

## 2024-10-03 RX ORDER — INFLUENZA A VIRUS A/VICTORIA/4897/2022 IVR-238 (H1N1) ANTIGEN (FORMALDEHYDE INACTIVATED), INFLUENZA A VIRUS A/CALIFORNIA/122/2022 SAN-022 (H3N2) ANTIGEN (FORMALDEHYDE INACTIVATED), AND INFLUENZA B VIRUS B/MICHIGAN/01/2021 ANTIGEN (FORMALDEHYDE INACTIVATED) 15; 15; 15 MG/.5ML; MG/.5ML; MG/.5ML
INJECTION, SUSPENSION INTRAMUSCULAR
Qty: 0.5 ML | Refills: 0 | OUTPATIENT
Start: 2024-10-03

## 2024-10-03 RX ORDER — COVID-19 VACCINE, MRNA 0.04 MG/.418ML
INJECTION, SUSPENSION INTRAMUSCULAR
Qty: 0.3 ML | Refills: 0 | OUTPATIENT
Start: 2024-10-03

## 2024-11-01 ENCOUNTER — OFFICE VISIT (OUTPATIENT)
Dept: CARDIOLOGY | Facility: MEDICAL CENTER | Age: 54
End: 2024-11-01
Attending: INTERNAL MEDICINE
Payer: COMMERCIAL

## 2024-11-01 VITALS
HEIGHT: 71 IN | WEIGHT: 169 LBS | BODY MASS INDEX: 23.66 KG/M2 | SYSTOLIC BLOOD PRESSURE: 122 MMHG | OXYGEN SATURATION: 97 % | RESPIRATION RATE: 16 BRPM | DIASTOLIC BLOOD PRESSURE: 62 MMHG | HEART RATE: 83 BPM

## 2024-11-01 DIAGNOSIS — Z95.2 S/P AORTIC VALVE REPLACEMENT: Chronic | ICD-10-CM

## 2024-11-01 DIAGNOSIS — I10 PRIMARY HYPERTENSION: ICD-10-CM

## 2024-11-01 DIAGNOSIS — E78.5 DYSLIPIDEMIA: Chronic | ICD-10-CM

## 2024-11-01 LAB — EKG IMPRESSION: NORMAL

## 2024-11-01 PROCEDURE — 99204 OFFICE O/P NEW MOD 45 MIN: CPT | Mod: 25 | Performed by: INTERNAL MEDICINE

## 2024-11-01 PROCEDURE — 3078F DIAST BP <80 MM HG: CPT | Performed by: INTERNAL MEDICINE

## 2024-11-01 PROCEDURE — 3074F SYST BP LT 130 MM HG: CPT | Performed by: INTERNAL MEDICINE

## 2024-11-01 PROCEDURE — 99212 OFFICE O/P EST SF 10 MIN: CPT | Performed by: INTERNAL MEDICINE

## 2024-11-01 PROCEDURE — 93010 ELECTROCARDIOGRAM REPORT: CPT | Performed by: INTERNAL MEDICINE

## 2024-11-01 PROCEDURE — 93005 ELECTROCARDIOGRAM TRACING: CPT | Performed by: INTERNAL MEDICINE

## 2024-11-01 ASSESSMENT — FIBROSIS 4 INDEX: FIB4 SCORE: 1.29

## 2024-11-01 NOTE — PROGRESS NOTES
Chief Complaint   Patient presents with    Dyslipidemia    Hypertension    Aortic Stenosis     F/v dx: S/P aortic valve replacement       Subjective     Alexandre Hernández is a 54 y.o. male who presents today to establish care for his history of bicuspid aortic valve replacement in 2016 last year I did a transesophageal echocardiogram which showed aortic regurgitation had also noticed that he had any aortic aneurysm so I help to coordinate surgical evaluation which he did this year at [facility] with good result 27 mm santiago konect resilia valved graft .  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;  Surgeon: [facility];  [place] [facility] [facility];  Service:     OTHER Right 01/01/2016    abscess on right foot drained    SHOULDER ARTHROSCOPY W/ ROTATOR CUFF REPAIR  11/05/2014    Performed by Nestor Clark M.D. at SURGERY [facility]    SHOULDER DECOMPRESSION ARTHROSCOPIC  11/05/2014    Performed by Nestor Clark M.D. at [facility] ORS    OTHER  03/01/2012    aortic (tissue) valve replacement    ANGIOGRAM  02/01/2012    OTHER   01/01/2004    orif left thumb    ACHILLES TENDON REPAIR  01/01/1990    left    OTHER  01/01/1989    removal of osteoid osteoma (T12)    OTHER CARDIAC SURGERY       Family History   Problem Relation Age of Onset    Hypertension Other     Stroke Other     Hyperlipidemia Mother     Hyperlipidemia Father     Heart Disease Father     Heart Disease Maternal Grandmother      Social History     Socioeconomic History    Marital status:      Spouse name: Not on file    Number of children: Not on file    Years of education: Not on file    Highest education level: Not on file   Occupational History    Not on file   Tobacco Use    Smoking status: Never    Smokeless tobacco: Never   Vaping Use    Vaping status: Never Used   Substance and Sexual Activity    Alcohol use: Yes     Comment: Occasional    Drug use: No    Sexual activity: Not on file   Other Topics Concern    Not on file   Social History Narrative    Not on file     Social Determinants of Health     Financial Resource Strain: Low Risk  (2/28/2023)    Received from [facility]    Overall Financial Resource Strain (CARDIA)     Difficulty of Paying Living Expenses: Not hard at all   Food Insecurity: No Food Insecurity (2/28/2023)    Received from [facility]    Hunger Vital Sign     Worried About Running Out of Food in the Last Year: Never true     Ran Out of Food in the Last Year: Never true   Transportation Needs: No Transportation Needs (2/28/2023)    Received from [facility]    PRAPARE - Transportation     Lack of Transportation (Medical): No     Lack of Transportation (Non-Medical): No   Physical Activity: Sufficiently Active (2/28/2023)    Received from [facility]    Exercise Vital Sign     Days of Exercise per Week: 7 days     Minutes of Exercise per Session: 30 min   Stress: No Stress Concern Present (2/28/2023)    Received from [facility]    [employer] [place] [employer] -  Occupational Stress Questionnaire     Feeling of Stress : Not at all   Social Connections: Moderately Isolated (2/28/2023)    Received from [facility], [facility]    Social Connection and Isolation Panel [NHANES]     Frequency of Communication with Friends and Family: Three times a week     Frequency of Social Gatherings with Friends and Family: Three times a week     Attends [employer] Services: Never     Active Member of Clubs or Organizations: No     Attends Club or Organization Meetings: Never     Marital Status:    Intimate Partner Violence: Unknown (10/18/2023)    Received from [facility]    History of Abuse     History of Abuse: Unable to assess   Housing Stability: Low Risk  (2/28/2023)    Received from [facility]    Housing Stability Vital Sign     Unable to Pay for Housing in the Last Year: No     Number of Places Lived in the Last Year: 1     Unstable Housing in the Last Year: No     Allergies   Allergen Reactions    Contrast Media With Iodine [Iodine] Anaphylaxis     Has had iopamidol (ISOVUE-370) 76 % injection 139 mL, Intravenous at [facility] with premedication      Hydrocodone Rash     Rash      Oxycodone Rash     rash    Coreg [Carvedilol]      fatigue    Metoprolol      fatigue     Outpatient Encounter Medications as of 11/1/2024   Medication Sig Dispense Refill    amoxicillin (AMOXIL) 500 MG Cap Take 4 tablets by mouth 1 hour prior to procedures 20 Capsule 3    rosuvastatin (CRESTOR) 10 MG Tab Take 10 mg by mouth every evening.      Cholecalciferol (VITAMIN D3 PO) Take 5,000 Units by mouth every day.      aspirin 81 MG EC tablet Take 81 mg by mouth every day.      losartan (COZAAR) 25 MG Tab Take 1 Tablet by mouth every day. 100 Tablet 3    Fexofenadine HCl (ALLEGRA PO) Take 1 Tablet by mouth every day.      [DISCONTINUED] COVID-19 mRNA Vac-Kaial,Pfizer, (COMIRNATY) 30 MCG/0.3ML Suspension Prefilled Syringe injection Inject   "into the shoulder, thigh, or buttocks. (Patient not taking: Reported on 11/1/2024) 0.3 mL 0    [DISCONTINUED] influenza vaccine (FLUZONE) 0.5 ML Suspension Prefilled Syringe injection Inject  into the shoulder, thigh, or buttocks. (Patient not taking: Reported on 11/1/2024) 0.5 mL 0     No facility-administered encounter medications on file as of 11/1/2024.     ROS           Objective     /62 (BP Location: Left arm, Patient Position: Sitting, BP Cuff Size: Adult)   Pulse 83   Resp 16   Ht 1.803 m (5' 11\")   Wt 76.7 kg (169 lb)   SpO2 97%   BMI 23.57 kg/m²     Physical Exam  Constitutional:       General: He is not in acute distress.     Appearance: He is not diaphoretic.   Eyes:      General: No scleral icterus.  Neck:      Vascular: No JVD.   Cardiovascular:      Rate and Rhythm: Normal rate.      Heart sounds: Normal heart sounds. No murmur heard.     No friction rub. No gallop.   Pulmonary:      Effort: No respiratory distress.      Breath sounds: No wheezing or rales.   Abdominal:      General: Bowel sounds are normal.      Palpations: Abdomen is soft.   Musculoskeletal:      Right lower leg: No edema.      Left lower leg: No edema.   Skin:     Findings: No rash.   Neurological:      Mental Status: He is alert. Mental status is at baseline.   Psychiatric:         Mood and Affect: Mood normal.            We reviewed in person the most recent labs  Recent Results (from the past 30 weeks)   EC-ECHOCARDIOGRAM COMPLETE W/O CONT    Collection Time: 05/02/24  5:28 PM   Result Value Ref Range    Left Ventrical Ejection Fraction 65    APOLIPOPROTEIN B    Collection Time: 05/28/24 11:20 AM   Result Value Ref Range    Apolipoprotein-B 90 66 - 133 mg/dL   CREATINE KINASE    Collection Time: 05/28/24 11:20 AM   Result Value Ref Range    CPK Total 170 (H) 0 - 154 U/L   Comp Metabolic Panel    Collection Time: 05/28/24 11:20 AM   Result Value Ref Range    Sodium 137 135 - 145 mmol/L    Potassium 4.5 3.6 - 5.5 " mmol/L    Chloride 100 96 - 112 mmol/L    Co2 22 20 - 33 mmol/L    Anion Gap 15.0 7.0 - 16.0    Glucose 92 65 - 99 mg/dL    Bun 14 8 - 22 mg/dL    Creatinine 1.06 0.50 - 1.40 mg/dL    Calcium 9.5 8.5 - 10.5 mg/dL    Correct Calcium 8.9 8.5 - 10.5 mg/dL    AST(SGOT) 37 12 - 45 U/L    ALT(SGPT) 34 2 - 50 U/L    Alkaline Phosphatase 126 (H) 30 - 99 U/L    Total Bilirubin 0.7 0.1 - 1.5 mg/dL    Albumin 4.7 3.2 - 4.9 g/dL    Total Protein 7.9 6.0 - 8.2 g/dL    Globulin 3.2 1.9 - 3.5 g/dL    A-G Ratio 1.5 g/dL   TESTOSTERONE,FREE ADULT MALE    Collection Time: 24 11:20 AM   Result Value Ref Range    Free Testosterone 63 47 - 244 pg/mL   TESTOSTERONE SERUM    Collection Time: 24 11:20 AM   Result Value Ref Range    Testosterone,Total 331 175 - 781 ng/dL   Lipid Profile    Collection Time: 24 11:20 AM   Result Value Ref Range    Cholesterol,Tot 195 100 - 199 mg/dL    Triglycerides 83 0 - 149 mg/dL    HDL 71 >=40 mg/dL     (H) <100 mg/dL   FASTING STATUS    Collection Time: 24 11:20 AM   Result Value Ref Range    Fasting Status Fasting    ESTIMATED GFR    Collection Time: 24 11:20 AM   Result Value Ref Range    GFR (CKD-EPI) 83 >60 mL/min/1.73 m 2   EKG    Collection Time: 24  8:55 AM   Result Value Ref Range    Report       [facility]    Test Date:  2024  Pt Name:    GOMEZ DUNBAR               [facility]  MRN:        [id number]                      Room:  Gender:     Male                         Technician: [facility]  :        1970                   Requested By:DINORA RAI  Order #:    [id number]                    Reading MD: Dinora Rai MD    Measurements  Intervals                                Axis  Rate:       75                           P:          86  FL:         223                          QRS:        102  QRSD:       115                          T:          91  QT:         414  QTc:        463    Interpretive  Statements  Sinus rhythm  Prolonged IL interval  Nonspecific intraventricular conduction delay  Nonspecific T abnormalities, lateral leads  Compared to ECG 10/28/2023 09:07:25  First degree AV block now present  Electronically Signed On 11- 10:31:50 PDT by Lucius Jean MD           Assessment & Plan     1. Dyslipidemia  EKG      2. S/P aortic valve replacement        3. Primary hypertension            Medical Decision Making: Today's Assessment/Status/Plan:        It was my pleasure to meet with Mr. Hernández.    We addressed the management of hypertension at today's visit. Blood pressure is well controlled.  We specifically assessed the labs on hypertension treatment    We addressed the management of dyslipidemia at today's visit. He is on appropriate statin.    We addressed the management of aortic valve replacement at today's visit. He understands the importance of antiplatelet therapy as well as dental prophylaxis for the health of their aortic valve replacement.  We have also ensured that he has appropriate echocardiogram follow-up.      I will see Mr. Hernández back in 3 year time and encouraged him to follow up with us over the phone or electronically using my MyChart as issues arise.    It is my pleasure to participate in the care of Mr. Hernández.  Please do not hesitate to contact me with questions or concerns.    Lucius Jean MD PhD [facility]  Cardiologist [facility]    Please note that this dictation was created using voice recognition software. There may be errors I did not discover before finalizing the note.         () Today's E/M visit is associated with medical care services that serve as the continuing focal point for all needed health care services and/or with medical care services that  are part of ongoing care related to a patient's single, serious condition, or a complex condition: This includes  furnishing services to patients on an ongoing  basis that result in care that is personalized  to the patient. The services result in a comprehensive, longitudinal, and continuous  relationship with the patient and involve delivery of team-based care that is accessible, coordinated with other practitioners and providers, and integrated with the broader health  care landscape.

## 2024-11-04 ENCOUNTER — APPOINTMENT (RX ONLY)
Dept: URBAN - METROPOLITAN AREA CLINIC 6 | Facility: CLINIC | Age: 54
Setting detail: DERMATOLOGY
End: 2024-11-04

## 2024-11-04 DIAGNOSIS — Z85.828 PERSONAL HISTORY OF OTHER MALIGNANT NEOPLASM OF SKIN: ICD-10-CM

## 2024-11-04 DIAGNOSIS — L57.0 ACTINIC KERATOSIS: ICD-10-CM | Status: IMPROVED

## 2024-11-04 DIAGNOSIS — Z71.89 OTHER SPECIFIED COUNSELING: ICD-10-CM

## 2024-11-04 DIAGNOSIS — D18.0 HEMANGIOMA: ICD-10-CM

## 2024-11-04 DIAGNOSIS — L21.8 OTHER SEBORRHEIC DERMATITIS: ICD-10-CM | Status: WELL CONTROLLED

## 2024-11-04 DIAGNOSIS — L73.8 OTHER SPECIFIED FOLLICULAR DISORDERS: ICD-10-CM

## 2024-11-04 DIAGNOSIS — L82.1 OTHER SEBORRHEIC KERATOSIS: ICD-10-CM

## 2024-11-04 DIAGNOSIS — L81.4 OTHER MELANIN HYPERPIGMENTATION: ICD-10-CM

## 2024-11-04 DIAGNOSIS — L85.3 XEROSIS CUTIS: ICD-10-CM

## 2024-11-04 DIAGNOSIS — D22 MELANOCYTIC NEVI: ICD-10-CM

## 2024-11-04 PROBLEM — D22.5 MELANOCYTIC NEVI OF TRUNK: Status: ACTIVE | Noted: 2024-11-04

## 2024-11-04 PROBLEM — D18.01 HEMANGIOMA OF SKIN AND SUBCUTANEOUS TISSUE: Status: ACTIVE | Noted: 2024-11-04

## 2024-11-04 PROCEDURE — ? ADDITIONAL NOTES

## 2024-11-04 PROCEDURE — ? PRESCRIPTION MEDICATION MANAGEMENT

## 2024-11-04 PROCEDURE — ? COUNSELING

## 2024-11-04 PROCEDURE — 99214 OFFICE O/P EST MOD 30 MIN: CPT

## 2024-11-04 PROCEDURE — ? SUNSCREEN RECOMMENDATIONS

## 2024-11-04 ASSESSMENT — LOCATION DETAILED DESCRIPTION DERM
LOCATION DETAILED: LEFT CENTRAL EYEBROW
LOCATION DETAILED: STERNUM
LOCATION DETAILED: RIGHT LOWER CUTANEOUS LIP
LOCATION DETAILED: RIGHT CAVUM CONCHA
LOCATION DETAILED: LEFT INFERIOR UPPER BACK
LOCATION DETAILED: LEFT CAVUM CONCHA
LOCATION DETAILED: RIGHT MID-UPPER BACK
LOCATION DETAILED: RIGHT MEDIAL MALAR CHEEK
LOCATION DETAILED: LEFT CENTRAL MALAR CHEEK
LOCATION DETAILED: SUPERIOR THORACIC SPINE
LOCATION DETAILED: LEFT NASAL ALA
LOCATION DETAILED: PERIUMBILICAL SKIN
LOCATION DETAILED: RIGHT ULNAR DORSAL HAND
LOCATION DETAILED: RIGHT MEDIAL EYEBROW

## 2024-11-04 ASSESSMENT — LOCATION SIMPLE DESCRIPTION DERM
LOCATION SIMPLE: CHEST
LOCATION SIMPLE: RIGHT UPPER BACK
LOCATION SIMPLE: ABDOMEN
LOCATION SIMPLE: LEFT EAR
LOCATION SIMPLE: LEFT UPPER BACK
LOCATION SIMPLE: RIGHT EAR
LOCATION SIMPLE: LEFT NOSE
LOCATION SIMPLE: RIGHT CHEEK
LOCATION SIMPLE: LEFT CHEEK
LOCATION SIMPLE: UPPER BACK
LOCATION SIMPLE: LEFT EYEBROW
LOCATION SIMPLE: RIGHT HAND
LOCATION SIMPLE: RIGHT EYEBROW
LOCATION SIMPLE: RIGHT LIP

## 2024-11-04 ASSESSMENT — LOCATION ZONE DERM
LOCATION ZONE: TRUNK
LOCATION ZONE: NOSE
LOCATION ZONE: HAND
LOCATION ZONE: EAR
LOCATION ZONE: LIP
LOCATION ZONE: FACE

## 2024-11-04 ASSESSMENT — SEVERITY ASSESSMENT: HOW SEVERE IS THIS PATIENT'S CONDITION?: ALMOST CLEAR

## 2024-11-04 NOTE — PROCEDURE: COUNSELING
Detail Level: Zone
Detail Level: Generalized
Sunscreen Recommendations: Recommended broad spectrum inorganic or physical sunscreens primarily containing zinc oxide or titanium dioxide.
Detail Level: Simple

## 2024-11-04 NOTE — PROCEDURE: PRESCRIPTION MEDICATION MANAGEMENT
Detail Level: Zone
Continue Regimen: Ciclopirox 0.77% cream BID PRN
Plan: May consider Rx for Zoryve cream in the future.
Render In Strict Bullet Format?: No

## 2024-11-04 NOTE — PROCEDURE: ADDITIONAL NOTES
Additional Notes: A few lesions treated with electrodessication today as a courtesy.
Detail Level: Detailed
Render Risk Assessment In Note?: no

## 2025-05-05 NOTE — Clinical Note
Member Name: Alexandre Hernández   Member Number: [id number]   Reference Number: [id number]   Approved Services: Echos and EKG   Approved Service Dates: 05/01/2025 - 07/30/2025   Requesting Provider: Lucius Jean   Requested Provider: [facility]     Dear Alexandre Tristan [place]:     The following medical service(s) requested by Lucius Jean have been approved:    Procedure Code Procedure Code Name Requested Quantity Approved Quantity Status   97116 (CPT®) [place] ECHO HEART XTHORACIC,COMPLETE W DOPPLER 1 1 Authorized       Approved Quantity means the number of visits approved for medication treatments and/or medical services.    The services should be provided by [facility] no later than 07/30/2025. Please contact the provider listed below with any questions.     Provider Information:  [facility]  [phone]    Your plan benefit may require a deductible, co-payment or coinsurance for these services. This authorization does not guarantee [facility] will pay the claim for services that you receive. Payment by [facility] for these services is subject to the terms of your Evidence of Coverage or Summary Plan Description, your eligibility at the time of service, and confirmation of benefit coverage.    For any questions or additional information, please contact Customer Service:    [facility]  Customer Service: [phone] or toll free [phone]  TTY users dial: [phone]   Call Center Hours: Mon - Fri 7 AM to 8 PM PST   Office Hours: Mon - Fri 8 AM to 5 PM [facility]   E-mail: [email or web address]   Website: [email or web address]       This information is available for free in other languages. Please contact Customer Service at the phone number above for more information. [facility] complies with applicable Federal civil rights laws and does not discriminate on the basis of race, color, national origin, age, disability or  sex.      Sincerely,     Healthcare Utilization Management Department     Cc: [facility]   Lucius Jean

## 2025-05-14 ENCOUNTER — RESULTS FOLLOW-UP (OUTPATIENT)
Dept: CARDIOLOGY | Facility: MEDICAL CENTER | Age: 55
End: 2025-05-14

## 2025-05-14 ENCOUNTER — HOSPITAL ENCOUNTER (OUTPATIENT)
Dept: CARDIOLOGY | Facility: MEDICAL CENTER | Age: 55
End: 2025-05-14
Attending: INTERNAL MEDICINE
Payer: COMMERCIAL

## 2025-05-14 DIAGNOSIS — Z95.2 S/P AORTIC VALVE REPLACEMENT: Chronic | ICD-10-CM

## 2025-05-14 LAB
LV EJECT FRACT  99904: 62
LV EJECT FRACT MOD 2C 99903: 61.55
LV EJECT FRACT MOD 4C 99902: 66.03
LV EJECT FRACT MOD BP 99901: 62.49

## 2025-05-14 PROCEDURE — 93306 TTE W/DOPPLER COMPLETE: CPT | Mod: 26 | Performed by: INTERNAL MEDICINE

## 2025-05-14 PROCEDURE — 93306 TTE W/DOPPLER COMPLETE: CPT

## 2025-05-28 ENCOUNTER — APPOINTMENT (OUTPATIENT)
Dept: RADIOLOGY | Facility: MEDICAL CENTER | Age: 55
End: 2025-05-28
Attending: ORTHOPAEDIC SURGERY
Payer: COMMERCIAL

## 2025-06-10 ENCOUNTER — HOSPITAL ENCOUNTER (OUTPATIENT)
Dept: RADIOLOGY | Facility: MEDICAL CENTER | Age: 55
End: 2025-06-10
Attending: ORTHOPAEDIC SURGERY
Payer: COMMERCIAL

## 2025-06-10 DIAGNOSIS — S46.012A STRAIN OF TENDON OF LEFT ROTATOR CUFF, INITIAL ENCOUNTER: ICD-10-CM

## 2025-06-10 PROCEDURE — 73221 MRI JOINT UPR EXTREM W/O DYE: CPT | Mod: LT

## 2025-08-05 ENCOUNTER — APPOINTMENT (OUTPATIENT)
Dept: URBAN - METROPOLITAN AREA CLINIC 6 | Facility: CLINIC | Age: 55
Setting detail: DERMATOLOGY
End: 2025-08-05

## 2025-08-05 DIAGNOSIS — B07.8 OTHER VIRAL WARTS: ICD-10-CM

## 2025-08-05 DIAGNOSIS — L82.1 OTHER SEBORRHEIC KERATOSIS: ICD-10-CM

## 2025-08-05 PROCEDURE — ? ADDITIONAL NOTES

## 2025-08-05 PROCEDURE — ? COUNSELING

## 2025-08-05 ASSESSMENT — LOCATION SIMPLE DESCRIPTION DERM
LOCATION SIMPLE: RIGHT FOREHEAD
LOCATION SIMPLE: LEFT THUMB

## 2025-08-05 ASSESSMENT — LOCATION DETAILED DESCRIPTION DERM
LOCATION DETAILED: LEFT DISTAL VENTRAL THUMB
LOCATION DETAILED: RIGHT SUPERIOR MEDIAL FOREHEAD

## 2025-08-05 ASSESSMENT — LOCATION ZONE DERM
LOCATION ZONE: FINGER
LOCATION ZONE: FACE

## 2025-08-11 ENCOUNTER — PHARMACY VISIT (OUTPATIENT)
Dept: PHARMACY | Facility: MEDICAL CENTER | Age: 55
End: 2025-08-11
Payer: COMMERCIAL

## 2025-08-11 PROCEDURE — RXMED WILLOW AMBULATORY MEDICATION CHARGE: Performed by: INTERNAL MEDICINE

## 2025-08-11 RX ORDER — CIPROFLOXACIN 500 MG/1
TABLET, FILM COATED ORAL
Qty: 1 TABLET | Refills: 0 | OUTPATIENT
Start: 2025-08-11